# Patient Record
Sex: FEMALE | Race: WHITE | NOT HISPANIC OR LATINO | Employment: OTHER | URBAN - METROPOLITAN AREA
[De-identification: names, ages, dates, MRNs, and addresses within clinical notes are randomized per-mention and may not be internally consistent; named-entity substitution may affect disease eponyms.]

---

## 2023-03-03 LAB — URINE CULTURE: ABNORMAL

## 2023-04-15 DIAGNOSIS — E06.3 HYPOTHYROIDISM DUE TO HASHIMOTO'S THYROIDITIS: Primary | ICD-10-CM

## 2023-04-15 DIAGNOSIS — E03.8 HYPOTHYROIDISM DUE TO HASHIMOTO'S THYROIDITIS: Primary | ICD-10-CM

## 2023-04-17 RX ORDER — LEVOTHYROXINE SODIUM 150 UG/1
150 TABLET ORAL DAILY
Qty: 90 TABLET | Refills: 0 | Status: SHIPPED | OUTPATIENT
Start: 2023-04-17 | End: 2023-09-06 | Stop reason: SDUPTHER

## 2023-04-17 RX ORDER — LEVOTHYROXINE SODIUM 150 UG/1
1 TABLET ORAL DAILY
COMMUNITY
Start: 2017-07-25 | End: 2023-04-17 | Stop reason: SDUPTHER

## 2023-04-17 RX ORDER — LEVOTHYROXINE SODIUM 150 UG/1
TABLET ORAL
Qty: 90 TABLET | Refills: 0 | Status: SHIPPED | OUTPATIENT
Start: 2023-04-17 | End: 2023-09-06 | Stop reason: SDUPTHER

## 2023-06-01 PROBLEM — E11.42 POLYNEUROPATHY IN DIABETES (MULTI): Status: ACTIVE | Noted: 2023-06-01

## 2023-06-01 PROBLEM — L23.9 ALLERGIC CONTACT DERMATITIS: Status: ACTIVE | Noted: 2023-06-01

## 2023-06-01 PROBLEM — H43.811 PVD (POSTERIOR VITREOUS DETACHMENT), RIGHT EYE: Status: ACTIVE | Noted: 2023-06-01

## 2023-06-01 PROBLEM — G47.33 OBSTRUCTIVE SLEEP APNEA: Status: ACTIVE | Noted: 2023-06-01

## 2023-06-01 PROBLEM — H52.13 MYOPIA OF BOTH EYES: Status: ACTIVE | Noted: 2023-06-01

## 2023-06-01 PROBLEM — G47.10 HYPERSOMNIA: Status: ACTIVE | Noted: 2023-06-01

## 2023-06-01 PROBLEM — N81.11 CYSTOCELE, MIDLINE: Status: ACTIVE | Noted: 2023-06-01

## 2023-06-01 PROBLEM — M25.579 ANKLE PAIN: Status: ACTIVE | Noted: 2023-06-01

## 2023-06-01 PROBLEM — G25.81 RESTLESS LEGS SYNDROME: Status: ACTIVE | Noted: 2023-06-01

## 2023-06-01 PROBLEM — H26.9 CATARACT: Status: ACTIVE | Noted: 2023-06-01

## 2023-06-01 PROBLEM — R06.09 DYSPNEA ON EXERTION: Status: ACTIVE | Noted: 2023-06-01

## 2023-06-01 PROBLEM — F32.1 DEPRESSION, MAJOR, SINGLE EPISODE, MODERATE (MULTI): Status: ACTIVE | Noted: 2023-06-01

## 2023-06-01 PROBLEM — R06.02 SOB (SHORTNESS OF BREATH) ON EXERTION: Status: ACTIVE | Noted: 2023-06-01

## 2023-06-01 PROBLEM — M25.50 ARTHRALGIA: Status: ACTIVE | Noted: 2023-06-01

## 2023-06-01 PROBLEM — E78.5 HYPERLIPIDEMIA: Status: ACTIVE | Noted: 2023-06-01

## 2023-06-01 PROBLEM — Z96.1 PSEUDOPHAKIA OF BOTH EYES: Status: ACTIVE | Noted: 2023-06-01

## 2023-06-01 PROBLEM — Z95.1 S/P CABG (CORONARY ARTERY BYPASS GRAFT): Status: ACTIVE | Noted: 2023-06-01

## 2023-06-01 PROBLEM — E11.3293 TYPE 2 DIABETES MELLITUS WITH MILD NONPROLIFERATIVE DIABETIC RETINOPATHY WITHOUT MACULAR EDEMA, BILATERAL (MULTI): Status: ACTIVE | Noted: 2023-06-01

## 2023-06-01 PROBLEM — N32.81 OVERACTIVE BLADDER: Status: ACTIVE | Noted: 2023-06-01

## 2023-06-01 PROBLEM — N95.2 VAGINAL ATROPHY: Status: ACTIVE | Noted: 2023-06-01

## 2023-06-01 PROBLEM — G47.11 IDIOPATHIC HYPERSOMNIA: Status: ACTIVE | Noted: 2023-06-01

## 2023-06-01 PROBLEM — H43.812 PVD (POSTERIOR VITREOUS DETACHMENT), LEFT EYE: Status: ACTIVE | Noted: 2023-06-01

## 2023-06-01 PROBLEM — I10 HYPERTENSION: Status: ACTIVE | Noted: 2023-06-01

## 2023-06-01 PROBLEM — G47.419 NARCOLEPSY (HHS-HCC): Status: ACTIVE | Noted: 2023-06-01

## 2023-06-01 PROBLEM — L30.4 INTERTRIGO: Status: ACTIVE | Noted: 2023-06-01

## 2023-06-01 PROBLEM — F32.A ANXIETY AND DEPRESSION: Status: ACTIVE | Noted: 2023-06-01

## 2023-06-01 PROBLEM — E66.01 MORBID OBESITY (MULTI): Status: ACTIVE | Noted: 2023-06-01

## 2023-06-01 PROBLEM — I25.10 CORONARY ARTERY DISEASE: Status: ACTIVE | Noted: 2023-06-01

## 2023-06-01 PROBLEM — F41.9 ANXIETY AND DEPRESSION: Status: ACTIVE | Noted: 2023-06-01

## 2023-06-01 PROBLEM — E03.9 HYPOTHYROIDISM: Status: ACTIVE | Noted: 2023-06-01

## 2023-06-01 PROBLEM — R92.8 ABNORMAL MAMMOGRAM: Status: ACTIVE | Noted: 2023-06-01

## 2023-06-01 PROBLEM — N39.3 FEMALE GENUINE STRESS INCONTINENCE: Status: ACTIVE | Noted: 2023-06-01

## 2023-06-01 PROBLEM — R76.8 ANA POSITIVE: Status: ACTIVE | Noted: 2023-06-01

## 2023-06-01 PROBLEM — E11.9 DIABETES MELLITUS (MULTI): Status: ACTIVE | Noted: 2023-06-01

## 2023-06-17 LAB — URINE CULTURE: ABNORMAL

## 2023-08-29 DIAGNOSIS — F41.9 ANXIETY AND DEPRESSION: Primary | ICD-10-CM

## 2023-08-29 DIAGNOSIS — F32.A ANXIETY AND DEPRESSION: Primary | ICD-10-CM

## 2023-08-29 RX ORDER — SERTRALINE HYDROCHLORIDE 100 MG/1
100 TABLET, FILM COATED ORAL NIGHTLY
Qty: 10 TABLET | Refills: 0 | Status: SHIPPED | OUTPATIENT
Start: 2023-08-29 | End: 2023-09-06 | Stop reason: SDUPTHER

## 2023-08-29 RX ORDER — SERTRALINE HYDROCHLORIDE 100 MG/1
100 TABLET, FILM COATED ORAL NIGHTLY
COMMUNITY
End: 2023-08-29 | Stop reason: SDUPTHER

## 2023-08-30 LAB — URINE CULTURE: NO GROWTH

## 2023-09-06 ENCOUNTER — OFFICE VISIT (OUTPATIENT)
Dept: PRIMARY CARE | Facility: CLINIC | Age: 63
End: 2023-09-06
Payer: MEDICARE

## 2023-09-06 VITALS
DIASTOLIC BLOOD PRESSURE: 62 MMHG | BODY MASS INDEX: 41.27 KG/M2 | WEIGHT: 233 LBS | HEART RATE: 66 BPM | SYSTOLIC BLOOD PRESSURE: 128 MMHG | OXYGEN SATURATION: 96 % | RESPIRATION RATE: 16 BRPM | TEMPERATURE: 97 F

## 2023-09-06 DIAGNOSIS — I10 PRIMARY HYPERTENSION: Primary | ICD-10-CM

## 2023-09-06 DIAGNOSIS — F41.9 ANXIETY AND DEPRESSION: ICD-10-CM

## 2023-09-06 DIAGNOSIS — F32.A ANXIETY AND DEPRESSION: ICD-10-CM

## 2023-09-06 DIAGNOSIS — M25.561 ACUTE PAIN OF RIGHT KNEE: ICD-10-CM

## 2023-09-06 DIAGNOSIS — E78.2 MIXED HYPERLIPIDEMIA: ICD-10-CM

## 2023-09-06 DIAGNOSIS — E06.3 HYPOTHYROIDISM DUE TO HASHIMOTO'S THYROIDITIS: ICD-10-CM

## 2023-09-06 DIAGNOSIS — E03.8 HYPOTHYROIDISM DUE TO HASHIMOTO'S THYROIDITIS: ICD-10-CM

## 2023-09-06 DIAGNOSIS — E11.42 DIABETIC POLYNEUROPATHY ASSOCIATED WITH TYPE 2 DIABETES MELLITUS (MULTI): ICD-10-CM

## 2023-09-06 PROBLEM — R06.02 SOB (SHORTNESS OF BREATH) ON EXERTION: Status: RESOLVED | Noted: 2023-06-01 | Resolved: 2023-09-06

## 2023-09-06 PROCEDURE — 4010F ACE/ARB THERAPY RXD/TAKEN: CPT | Performed by: FAMILY MEDICINE

## 2023-09-06 PROCEDURE — 3074F SYST BP LT 130 MM HG: CPT | Performed by: FAMILY MEDICINE

## 2023-09-06 PROCEDURE — 3078F DIAST BP <80 MM HG: CPT | Performed by: FAMILY MEDICINE

## 2023-09-06 PROCEDURE — 99214 OFFICE O/P EST MOD 30 MIN: CPT | Performed by: FAMILY MEDICINE

## 2023-09-06 PROCEDURE — 3052F HG A1C>EQUAL 8.0%<EQUAL 9.0%: CPT | Performed by: FAMILY MEDICINE

## 2023-09-06 PROCEDURE — 1036F TOBACCO NON-USER: CPT | Performed by: FAMILY MEDICINE

## 2023-09-06 RX ORDER — ATORVASTATIN CALCIUM 80 MG/1
80 TABLET, FILM COATED ORAL NIGHTLY
Qty: 90 TABLET | Refills: 1 | Status: SHIPPED | OUTPATIENT
Start: 2023-09-06 | End: 2024-03-06 | Stop reason: SDUPTHER

## 2023-09-06 RX ORDER — GABAPENTIN 300 MG/1
300 CAPSULE ORAL DAILY
Qty: 30 CAPSULE | Refills: 11
Start: 2023-09-06 | End: 2023-11-02 | Stop reason: SDUPTHER

## 2023-09-06 RX ORDER — LEVOTHYROXINE SODIUM 150 UG/1
150 TABLET ORAL DAILY
Qty: 90 TABLET | Refills: 1 | Status: SHIPPED | OUTPATIENT
Start: 2023-09-06 | End: 2024-03-06 | Stop reason: SDUPTHER

## 2023-09-06 RX ORDER — METOPROLOL SUCCINATE 25 MG/1
25 TABLET, EXTENDED RELEASE ORAL 2 TIMES DAILY
COMMUNITY
End: 2023-11-02 | Stop reason: SDUPTHER

## 2023-09-06 RX ORDER — MIRABEGRON 25 MG/1
25 TABLET, FILM COATED, EXTENDED RELEASE ORAL DAILY
COMMUNITY
Start: 2022-02-01 | End: 2023-10-06

## 2023-09-06 RX ORDER — VALSARTAN 80 MG/1
80 TABLET ORAL DAILY
Qty: 90 TABLET | Refills: 1 | Status: SHIPPED | OUTPATIENT
Start: 2023-09-06 | End: 2024-03-06 | Stop reason: SDUPTHER

## 2023-09-06 RX ORDER — SERTRALINE HYDROCHLORIDE 100 MG/1
100 TABLET, FILM COATED ORAL NIGHTLY
Qty: 90 TABLET | Refills: 1 | Status: SHIPPED | OUTPATIENT
Start: 2023-09-06 | End: 2024-03-06 | Stop reason: SDUPTHER

## 2023-09-06 NOTE — PROGRESS NOTES
Subjective   Patient ID: Rajni Casiano is a 62 y.o. female who presents for Follow-up (6 mth med check).    Here for general check and med refill.    Taking medications daily for hypertension hyperlipidemia diabetes hypothyroidism, narcolepsy and sleep apnea  and anxiety.  Stable on all meds.     Seeing endocrinology regular.   Last A1C 8.6 and improving    Has been having issues with her mom and caring for her  Custody with her 2 great grandkids 1 yr and 2.5 yr old  Grandaughter on drugs  Going to court all the time  Has a lot of stressors in her life   Diet has not the best    Denies chest pain, shortness of breath, lightheaded, dizziness or headaches.     Had been off her sertraline for a few weeks and noticed a big difference.   Restarted and feeling much better    Admits to right knee pain and swelling for the past few weeks  No known injury           Review of Systems    Objective   /62   Pulse 66   Temp 36.1 °C (97 °F)   Resp 16   Wt 106 kg (233 lb)   SpO2 96%   BMI 41.27 kg/m²     Physical Exam    Assessment/Plan   Problem List Items Addressed This Visit       Anxiety and depression     Stable  Refilling medication at same dose         Relevant Medications    sertraline (Zoloft) 100 mg tablet    Hyperlipidemia     Stable  Reviewed previous labs and at goal.  Refilling meds at same dose.  Recheck in 6 months         Relevant Medications    atorvastatin (Lipitor) 80 mg tablet    Other Relevant Orders    Comprehensive Metabolic Panel    Lipid Panel    Hypertension - Primary     Stable  Refilling medication at same dose.  Reviewed recent labs.  Recheck in 3 months         Relevant Medications    valsartan (Diovan) 80 mg tablet    Other Relevant Orders    CBC and Auto Differential    Hypothyroidism     Stable  Checking TSH level and adjust meds accordingly             Relevant Medications    levothyroxine (Synthroid, Levoxyl) 150 mcg tablet    Other Relevant Orders    TSH with reflex to Free T4 if  abnormal    Polyneuropathy in diabetes (CMS/HCC)     Stable  Continue present meds         Relevant Medications    gabapentin (Neurontin) 300 mg capsule     Other Visit Diagnoses       Acute pain of right knee        suspect OA/tendonitis  Trial of Nsaids, ice, compression and rest.  Recommending PT eval, declined  Consider ortho if persists for injection

## 2023-10-04 DIAGNOSIS — N32.81 OAB (OVERACTIVE BLADDER): Primary | ICD-10-CM

## 2023-10-06 RX ORDER — MIRABEGRON 25 MG/1
TABLET, FILM COATED, EXTENDED RELEASE ORAL
Qty: 30 TABLET | Refills: 0 | Status: SHIPPED | OUTPATIENT
Start: 2023-10-06 | End: 2023-11-17

## 2023-10-18 ENCOUNTER — TELEPHONE (OUTPATIENT)
Dept: PRIMARY CARE | Facility: CLINIC | Age: 63
End: 2023-10-18
Payer: MEDICARE

## 2023-10-18 NOTE — TELEPHONE ENCOUNTER
Patient called and left , wants appointment for cough.  Called patient, states has had cough for a week and a half now.  Tested for COVID 3 days ago, negative.  Offered possible appointment for Friday but patient did not want to wait, going to Urgent Care.

## 2023-11-02 ENCOUNTER — LAB (OUTPATIENT)
Dept: LAB | Facility: LAB | Age: 63
End: 2023-11-02
Payer: MEDICARE

## 2023-11-02 ENCOUNTER — OFFICE VISIT (OUTPATIENT)
Dept: NEUROLOGY | Facility: HOSPITAL | Age: 63
End: 2023-11-02
Payer: MEDICARE

## 2023-11-02 VITALS
DIASTOLIC BLOOD PRESSURE: 64 MMHG | BODY MASS INDEX: 40.34 KG/M2 | HEART RATE: 66 BPM | WEIGHT: 227.7 LBS | SYSTOLIC BLOOD PRESSURE: 107 MMHG

## 2023-11-02 DIAGNOSIS — G47.33 OBSTRUCTIVE SLEEP APNEA: ICD-10-CM

## 2023-11-02 DIAGNOSIS — G25.81 RESTLESS LEGS SYNDROME: ICD-10-CM

## 2023-11-02 DIAGNOSIS — Z51.81 ENCOUNTER FOR THERAPEUTIC DRUG LEVEL MONITORING: ICD-10-CM

## 2023-11-02 DIAGNOSIS — G47.11 IDIOPATHIC HYPERSOMNIA: ICD-10-CM

## 2023-11-02 DIAGNOSIS — E11.42 DIABETIC POLYNEUROPATHY ASSOCIATED WITH TYPE 2 DIABETES MELLITUS (MULTI): ICD-10-CM

## 2023-11-02 DIAGNOSIS — G47.419 PRIMARY NARCOLEPSY WITHOUT CATAPLEXY (HHS-HCC): ICD-10-CM

## 2023-11-02 DIAGNOSIS — G47.419 PRIMARY NARCOLEPSY WITHOUT CATAPLEXY (HHS-HCC): Primary | ICD-10-CM

## 2023-11-02 PROBLEM — N39.46 MIXED STRESS AND URGE URINARY INCONTINENCE: Status: ACTIVE | Noted: 2023-11-02

## 2023-11-02 PROBLEM — I10 ESSENTIAL HYPERTENSION: Status: ACTIVE | Noted: 2020-05-05

## 2023-11-02 LAB
AMPHETAMINES UR QL SCN: ABNORMAL
BARBITURATES UR QL SCN: ABNORMAL
BENZODIAZ UR QL SCN: ABNORMAL
BZE UR QL SCN: ABNORMAL
CANNABINOIDS UR QL SCN: ABNORMAL
FENTANYL+NORFENTANYL UR QL SCN: ABNORMAL
OPIATES UR QL SCN: ABNORMAL
OXYCODONE+OXYMORPHONE UR QL SCN: ABNORMAL
PCP UR QL SCN: ABNORMAL

## 2023-11-02 PROCEDURE — 80324 DRUG SCREEN AMPHETAMINES 1/2: CPT

## 2023-11-02 PROCEDURE — 4010F ACE/ARB THERAPY RXD/TAKEN: CPT | Performed by: NURSE PRACTITIONER

## 2023-11-02 PROCEDURE — 3052F HG A1C>EQUAL 8.0%<EQUAL 9.0%: CPT | Performed by: NURSE PRACTITIONER

## 2023-11-02 PROCEDURE — 80307 DRUG TEST PRSMV CHEM ANLYZR: CPT

## 2023-11-02 PROCEDURE — 3074F SYST BP LT 130 MM HG: CPT | Performed by: NURSE PRACTITIONER

## 2023-11-02 PROCEDURE — 3078F DIAST BP <80 MM HG: CPT | Performed by: NURSE PRACTITIONER

## 2023-11-02 PROCEDURE — 1125F AMNT PAIN NOTED PAIN PRSNT: CPT | Performed by: NURSE PRACTITIONER

## 2023-11-02 PROCEDURE — 99215 OFFICE O/P EST HI 40 MIN: CPT | Performed by: NURSE PRACTITIONER

## 2023-11-02 PROCEDURE — 1036F TOBACCO NON-USER: CPT | Performed by: NURSE PRACTITIONER

## 2023-11-02 PROCEDURE — 1160F RVW MEDS BY RX/DR IN RCRD: CPT | Performed by: NURSE PRACTITIONER

## 2023-11-02 PROCEDURE — 1157F ADVNC CARE PLAN IN RCRD: CPT | Performed by: NURSE PRACTITIONER

## 2023-11-02 PROCEDURE — 1159F MED LIST DOCD IN RCRD: CPT | Performed by: NURSE PRACTITIONER

## 2023-11-02 RX ORDER — GABAPENTIN 300 MG/1
300 CAPSULE ORAL NIGHTLY
Qty: 90 CAPSULE | Refills: 1 | Status: SHIPPED | OUTPATIENT
Start: 2023-11-02 | End: 2024-06-03 | Stop reason: SDUPTHER

## 2023-11-02 RX ORDER — GLUCAGON INJECTION, SOLUTION 1 MG/.2ML
INJECTION, SOLUTION SUBCUTANEOUS
COMMUNITY
Start: 2023-06-27

## 2023-11-02 RX ORDER — METHYLPHENIDATE HYDROCHLORIDE 10 MG/1
10 TABLET ORAL 2 TIMES DAILY
COMMUNITY
End: 2023-11-02 | Stop reason: SDUPTHER

## 2023-11-02 RX ORDER — DULAGLUTIDE 4.5 MG/.5ML
4.5 INJECTION, SOLUTION SUBCUTANEOUS
COMMUNITY
Start: 2023-06-30 | End: 2024-03-06

## 2023-11-02 RX ORDER — METOPROLOL TARTRATE 25 MG/1
1 TABLET, FILM COATED ORAL 2 TIMES DAILY
COMMUNITY
Start: 2017-02-08 | End: 2023-11-16

## 2023-11-02 RX ORDER — METHYLPHENIDATE HYDROCHLORIDE 20 MG/1
20 TABLET ORAL 2 TIMES DAILY
Qty: 60 TABLET | Refills: 0 | Status: SHIPPED | OUTPATIENT
Start: 2023-11-02 | End: 2024-01-09 | Stop reason: SDUPTHER

## 2023-11-02 ASSESSMENT — SLEEP AND FATIGUE QUESTIONNAIRES
HOW LIKELY ARE YOU TO NOD OFF OR FALL ASLEEP WHILE LYING DOWN TO REST IN THE AFTERNOON WHEN CIRCUMSTANCES PERMIT: HIGH CHANCE OF DOZING
HOW LIKELY ARE YOU TO NOD OFF OR FALL ASLEEP WHILE WATCHING TV: HIGH CHANCE OF DOZING
HOW LIKELY ARE YOU TO NOD OFF OR FALL ASLEEP WHILE SITTING AND READING: HIGH CHANCE OF DOZING
HOW LIKELY ARE YOU TO NOD OFF OR FALL ASLEEP WHILE SITTING AND TALKING TO SOMEONE: SLIGHT CHANCE OF DOZING
HOW LIKELY ARE YOU TO NOD OFF OR FALL ASLEEP WHILE SITTING QUIETLY AFTER LUNCH WITHOUT ALCOHOL: MODERATE CHANCE OF DOZING
HOW LIKELY ARE YOU TO NOD OFF OR FALL ASLEEP WHEN YOU ARE A PASSENGER IN A CAR FOR AN HOUR WITHOUT A BREAK: HIGH CHANCE OF DOZING

## 2023-11-02 ASSESSMENT — ENCOUNTER SYMPTOMS
LOSS OF SENSATION IN FEET: 1
DEPRESSION: 0
OCCASIONAL FEELINGS OF UNSTEADINESS: 1

## 2023-11-02 ASSESSMENT — PAIN SCALES - GENERAL: PAINLEVEL: 5

## 2023-11-02 ASSESSMENT — VISUAL ACUITY: VA_NORMAL: 1

## 2023-11-02 NOTE — ASSESSMENT & PLAN NOTE
Methylphenidate increased to 20 mg BID.   Pt does have history of CAD s/p bypass per pt. States previously cleared by her cardiologist to be on stimulants. HR and BP are good. Discussed risk associated with increasing stimulant dosing, pt states understanding.   UDS ordered - pt currently out of her methylphenidate.  CSA signed today.   I have personally reviewed the OARRS report for this patient. The report is scanned into the electronic medical record. I have considered the risks of abuse, dependence, addiction and diversion. I believe that it is clinically appropriate for this patient to be prescribed this medication based on documented diagnosis of hypersomnia and narcolepsy. An updated contract between the patient and myself was signed, scanned into the electronic medical record, and the patient agrees to the terms. Any deviation from the agreement will result in termination from my practice.

## 2023-11-02 NOTE — PATIENT INSTRUCTIONS
Sleep Hygiene practices include:  - Go to bed and wake up at around the same times each day, even on weekends. Avoid naps.   - If you cannot fall asleep within 15-30 min after going to bed, get up and read or do some other relaxing activity until you feel tired.  - Reduce stress. Do something relaxing in the evening before bedtime.  - Avoid caffeine (especially after noon), alcohol, tobacco, and medicines that keep you awake.  - Alcohol and recreational drugs actually make insomnia more likely.  - Try drinking less water in the evening to avoid waking up to go to the bathroom during the night.   - Get regular exercise for 30-60 min at least 3 times per week, but do it at least 4-6 hours before bedtime.  - Reserve the bedroom for sleep and sexual activity, not for watching tv or other activities.  - Negative envisioning (instead of trying to fall asleep, try to stay awake!)  - Avoid disturbing content in books or media immediately prior to sleep. Plan 30 min of pleasant or relaxing activities prior to going to bed.  - The computer can act as a stimulant. Discontinue working or playing on the computer one hour prior to desired sleep onset.  - Sleepio or Shut-I are online programs for cognitive behavioral therapy which may help.  - Practice relaxation techniques.     Your Positive Airway Pressure (CPAP/BiPAP) settings are adequately controlling your Apnea, please continue to use nightly.   An updated supply order has been sent to the [facility] [phone].     As a general guideline, please replace your:   -PAP cushions every 2-4 weeks  -mask every 3-6 months  -hose every 3-6 months  -Filter (disposable) every 2-4 weeks  -machine may need replacement in 5-10 years (once they stop working).

## 2023-11-02 NOTE — PROGRESS NOTES
"SUBJECTIVE    Rajni Casiano is a 63 y.o. right-handed female who presents with   Chief Complaint   Patient presents with    hypersomnia    Narcolepsy    Restless Legs    Sleep Apnea     FU visit.      .    Presents for follow up visit  Visit type: in-clinic visit    HISTORY OF PRESENT ILLNESS    RECAP:  Former patient of Dr. Brink.   \"12/6/2022:  - GODWIN :She reports she is using her CPAP every night and is able to wear it all night. CPAP pressure 10, average ASI 2.7. She has a separate machine she wears at her mom's house. She does notice a difference when she forgets to wear it. She is still experiencing a lot of night time waking to use the bathroom.   - At the last visit, she was increased to Adderall 20 mg in the morning and after lunch. She has not noticed a difference in her daytime sleepiness since the increased dose. She has not experienced any side effects including weight changes, heart palpitations. Falling asleep 2-3 x each morning. No drop attacks, hallucinations, cataplexy. - [place] sleepiness scale 14. \"I get up in the morning around 9:00a.m. to get ready.\" Takes her shower, walks to dog and then goes back to sleep if she sleeps in her chair.   Usually goes to bed at 1:00 a.m., but has to get up several times a night to go to the bathroom. Has incontinence and bladder sling. Helped. but not completely. That is causing fragmented sleep.   - Restless legs well controlled on gabapentin 300 mg at night.   - Completed controlled substance agreement today   - No other changes to health or visits to the hospital      Finds short naps refreshing.      IMP:   1. Narcolepsy vs. Idiopathic hypersomnia. No improvement since increased Adderall to 20mg twice daily.   2. Restless leg syndrome. Good control on gabapentin.   3. GODWIN, good control with CPAP      1 Keep adderall as 20mg BID.  I personally reviewed the OARRS report for this patient. The report is scanned into the electronic medical record. An " updated stimulant contract between the patient and myself was signed, scanned into the electronic medical record and the patient agrees to the terms. Any deviation from the agreement will result in termination from my practice.  2. Refilled gabapentin.   3. Trial of Wakix 17.8 mg. Start with 8.9 mg x one week.  4. Urine drug screen.      1/10/23 - Urine drug screen reviewed, no variances. Really tired in mornings. Can wake up, eat breakfast and go back to sleep for 30 min to a few hours. After that doing well until she goes to sleep around 1 am. Wakes up around 9 am.      Gabapentin is controlling her RLS well. No continued difficulty falling asleep due to RLS.     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      Most recent PSG and MSLT done at  in [place] in 2017 - indicative of idiopathic hypersomnia at that time. PLMs at 11.5. No AHI given but CPAP titrated up from pressure 7 to pressure 10 to control her AHI.      Previously on vyvanse and ropinerole.      ESS 11     Pt did try the wakix for a couple days. States she did not feel well and was having some back pain. She stopped taking it, is now feeling better and would like to try it again.     11/02/23 -     Reports was doing well on ritalin, still sleepy throughout day but able to stay awake if doing something. Currently out of her medication including gabapentin as well. Feels the ritalin is similar in efficacy to the adderall for her.   Just got custody of two grandkids. One is 2.5 years old so needing to be awake to raise them.     CPAP download unavailable at this time.     ESS (see below) 17/24 today    REVIEW OF SYSTEMS:  10 point review of systems performed and is negative except as noted in the HPI.     Past Medical History:   Diagnosis Date    Narcolepsy without cataplexy     Narcolepsy    Obstructive sleep apnea (adult) (pediatric)     Obstructive sleep apnea    Personal history of other diseases of the circulatory system      History of hypertension    Personal history of other endocrine, nutritional and metabolic disease     History of hypercholesterolemia    Personal history of other endocrine, nutritional and metabolic disease     History of hypothyroidism    Personal history of other endocrine, nutritional and metabolic disease     History of diabetes mellitus       No relevant family history has been documented for this patient.    Past Surgical History:   Procedure Laterality Date    CATARACT EXTRACTION  07/03/2013    Cataract Surgery    CORONARY ARTERY BYPASS GRAFT  01/12/2017    CABG    HYSTERECTOMY  07/03/2013    Hysterectomy    NECK SURGERY  07/03/2013    Neck Surgery       Social History     Substance and Sexual Activity   Drug Use Not Currently     Tobacco Use: Low Risk  (11/2/2023)    Patient History     Smoking Tobacco Use: Never     Smokeless Tobacco Use: Never     Passive Exposure: Never     Alcohol Use: Not on file       Current Outpatient Medications on File Prior to Visit   Medication Sig Dispense Refill    atorvastatin (Lipitor) 80 mg tablet Take 1 tablet (80 mg) by mouth once daily at bedtime. 90 tablet 1    Gvoke PFS 2-Pack Syringe 1 mg/0.2 mL syringe INJECT 1 MG SUBCUTANEOUSLY AS NEEDED FOR HYPOGLYCEMIA. IF BLOOD GLUCOSE NOT RESPONDING IN 15 MINUTES  REPEAT WITH ANOTHER DOSE      insulin regular (HumuLIN R U-500) 500 unit/mL CONCENTRATED injection .25 mL brkfst, .25 mL lunch, .10 mL dinner Subcutaneous with meals for 90 days      levothyroxine (Synthroid, Levoxyl) 150 mcg tablet Take 1 tablet (150 mcg) by mouth once daily. 90 tablet 1    metoprolol tartrate (Lopressor) 25 mg tablet Take 1 tablet (25 mg) by mouth 2 times a day.      Myrbetriq 25 mg tablet extended release 24 hr 24 hr tablet TAKE ONE TABLET BY MOUTH ONCE DAILY. DO NOT CRUSH OR CUT. 30 tablet 0    sertraline (Zoloft) 100 mg tablet Take 1 tablet (100 mg) by mouth once daily at bedtime. 90 tablet 1    Trulicity 4.5 mg/0.5 mL pen injector Inject 4.5  mg under the skin 1 (one) time per week.      valsartan (Diovan) 80 mg tablet Take 1 tablet (80 mg) by mouth once daily. 90 tablet 1    [DISCONTINUED] dulaglutide (Trulicity) 0.75 mg/0.5 mL pen injector Inject 0.75 mg under the skin 1 (one) time per week.      [DISCONTINUED] gabapentin (Neurontin) 300 mg capsule Take 1 capsule (300 mg) by mouth once daily. 30 capsule 11    [DISCONTINUED] methylphenidate (Ritalin) 10 mg tablet Take 1 tablet (10 mg) by mouth 2 times a day.      [DISCONTINUED] metoprolol succinate XL (Toprol-XL) 25 mg 24 hr tablet Take 1 tablet (25 mg) by mouth 2 times a day.      [DISCONTINUED] amphetamine-dextroamphetamine (Adderall) 10 mg tablet Take 1 tablet (10 mg) by mouth once daily.       No current facility-administered medications on file prior to visit.         OBJECTIVE    /64   Pulse 66   Wt 103 kg (227 lb 11.2 oz)   BMI 40.34 kg/m²     Physical Exam  Eyes:      General: Lids are normal.      Extraocular Movements: Extraocular movements intact.      Pupils: Pupils are equal, round, and reactive to light.   Neurological:      Motor: Motor strength is normal.     Coordination: Coordination is intact.   Psychiatric:         Speech: Speech normal.       Neurological Exam  Mental Status  Awake, alert and oriented to person, place and time. Oriented to person, place, time and situation. Recent and remote memory are intact. Speech is normal. Language is fluent with no aphasia. Attention and concentration are normal. Fund of knowledge is appropriate for level of education.    Cranial Nerves  CN II: Visual acuity is normal. Visual fields full to confrontation.  CN III, IV, VI: Extraocular movements intact bilaterally. Normal lids and orbits bilaterally. Pupils equal round and reactive to light bilaterally.  CN V: Facial sensation is normal.  CN VII: Full and symmetric facial movement.  CN VIII: Hearing is normal.  CN IX, X: Palate elevates symmetrically. Normal gag reflex.  CN XI: Shoulder  shrug strength is normal.  CN XII: Tongue midline without atrophy or fasciculations.    Motor  Normal muscle bulk throughout. No fasciculations present. Normal muscle tone. No abnormal involuntary movements. Strength is 5/5 throughout all four extremities.    Sensory  Sensation is intact to light touch, pinprick, vibration and proprioception in all four extremities.    Coordination    Finger-to-nose, rapid alternating movements and heel-to-shin normal bilaterally without dysmetria.    Gait  Casual gait is normal including stance, stride, and arm swing.      EPWORTH SLEEPINESS SCALE   Sitting and reading 3   Watching television 3   Sitting inactive in a public place 2   As passenger in car for an hour or more without a break 3   Lying down to rest in the afternoon when circumstances permit 3   Sitting and talking to someone 1   Sitting quietly after a lunch without alcohol 2   In a car while stopped for a few minutes in traffic 0   TOTAL 17/24         CT HEAD WO IV CONTRAST 01/18/2020    Narrative  MRN: [id number]  Patient Name: CAROLIN VILLAGOMEZ    STUDY:  CT HEAD WO CONTRAST; 1/18/2020 2:58 am    INDICATION:  Fall with head injury.    COMPARISON:  None.    ACCESSION NUMBER(S):  [id number]    ORDERING CLINICIAN:  ZANE YUSUF    TECHNIQUE:  Axial noncontrast CT images of the head.    FINDINGS:  BRAIN PARENCHYMA: Gray-white matter interfaces are preserved. No mass  effect or midline shift.    HEMORRHAGE: No acute intracranial hemorrhage.  VENTRICLES and EXTRA-AXIAL SPACES: Normal size.  EXTRACRANIAL SOFT TISSUES: Within normal limits.  PARANASAL SINUSES/MASTOIDS: The visualized paranasal sinuses and  mastoid air cells are aerated.  CALVARIUM: No depressed skull fracture. No destructive osseous lesion.    OTHER FINDINGS: None.    Impression  No acute intracranial abnormality.             ASSESSMENT & PLAN  Problem List Items Addressed This Visit          Neuro    Restless legs syndrome    Overview     Well controlled on  gabapentin 300 mg at night         Current Assessment & Plan     Currently out of medication, rx sent.          Relevant Medications    gabapentin (Neurontin) 300 mg capsule    Other Relevant Orders    Follow Up In Neurology    Polyneuropathy in diabetes (CMS/HCC)    Relevant Medications    gabapentin (Neurontin) 300 mg capsule    Other Relevant Orders    Follow Up In Neurology       Sleep    Idiopathic hypersomnia    Overview     Has prior narcolepsy diagnosis but no supporting MSLT  Sleep attacks controlled on stimulant but still sleepy overall.   No cataplexy.   Failed wakix (side effects) and vyvanse (no help per pt)  S/p adderall 30 mg am and 10 mg early afternoon (changed to ritalin due to shortage)  Currently on methylphenidate 10 mg BID. Still very sleepy.            Current Assessment & Plan     Methylphenidate increased to 20 mg BID.   Pt does have history of CAD s/p bypass per pt. States previously cleared by her cardiologist to be on stimulants. HR and BP are good. Discussed risk associated with increasing stimulant dosing, pt states understanding.   UDS ordered - pt currently out of her methylphenidate.  CSA signed today.   I have personally reviewed the OARRS report for this patient. The report is scanned into the electronic medical record. I have considered the risks of abuse, dependence, addiction and diversion. I believe that it is clinically appropriate for this patient to be prescribed this medication based on documented diagnosis of hypersomnia and narcolepsy. An updated contract between the patient and myself was signed, scanned into the electronic medical record, and the patient agrees to the terms. Any deviation from the agreement will result in termination from my practice.           Relevant Medications    methylphenidate (Ritalin) 20 mg tablet    Other Relevant Orders    Drug Screen, Urine With Reflex to Confirmation (Completed)    Follow Up In Neurology    Narcolepsy - Primary    Relevant  Medications    methylphenidate (Ritalin) 20 mg tablet    Other Relevant Orders    Drug Screen, Urine With Reflex to Confirmation (Completed)    Follow Up In Neurology    Obstructive sleep apnea    Relevant Orders    Positive Airway Pressure (PAP) Therapy    Follow Up In Neurology     Other Visit Diagnoses       Encounter for therapeutic drug level monitoring        Relevant Orders    Drug Screen, Urine With Reflex to Confirmation (Completed)    Follow Up In Neurology              FU in 6 months   I personally spent 48 minutes today, exclusive of procedures, providing care for this patient, including preparation, face to face time, documentation and other services such as review of medical records, diagnostic result, patient education, counseling, coordination of care as specified in the encounter.

## 2023-11-06 LAB
AMPHET UR-MCNC: 3941 NG/ML
MDA UR-MCNC: <200 NG/ML
MDEA UR-MCNC: <200 NG/ML
MDMA UR-MCNC: <200 NG/ML
METHAMPHET UR-MCNC: <200 NG/ML
PHENTERMINE UR CFM-MCNC: <200 NG/ML

## 2023-11-16 DIAGNOSIS — Z95.1 S/P CABG (CORONARY ARTERY BYPASS GRAFT): ICD-10-CM

## 2023-11-16 DIAGNOSIS — N32.81 OAB (OVERACTIVE BLADDER): ICD-10-CM

## 2023-11-16 DIAGNOSIS — I25.10 CORONARY ARTERY DISEASE INVOLVING NATIVE CORONARY ARTERY OF NATIVE HEART, UNSPECIFIED WHETHER ANGINA PRESENT: Primary | ICD-10-CM

## 2023-11-16 RX ORDER — METOPROLOL TARTRATE 25 MG/1
25 TABLET, FILM COATED ORAL 2 TIMES DAILY
Qty: 180 TABLET | Refills: 3 | Status: SHIPPED | OUTPATIENT
Start: 2023-11-16 | End: 2024-11-10

## 2023-11-17 RX ORDER — MIRABEGRON 25 MG/1
25 TABLET, FILM COATED, EXTENDED RELEASE ORAL DAILY
Qty: 30 TABLET | Refills: 0 | Status: SHIPPED | OUTPATIENT
Start: 2023-11-17 | End: 2024-03-06

## 2023-12-09 NOTE — PROGRESS NOTES
HISTORY OF PRESENT ILLNESS    Rajni marie 63 y.o. here today for cystoscopy + intradetrusor Botox injections     Urinary symptoms:  - S/P 6/28: cystoscopy + intradetrusor Botox injections 100 units      She did not feel like the last injection helped her symptyoms.  Discussed pros and cons of increasing dose and opted to do 130 units today    Procedures  The patient gave a urine sample which was checked for infection and found to be negative.  Pt was numbed for 20 min with lidojet inserted in to the bladder and given 100 mg po pyridium.    The patient was consented for cytoscopic intradetrusor Botox injection.     130   Units was reconstituted in  7   mL of NS  Using a flexible scope injection was performed at 4 sites using the Laborie needle at a 3 mm depth starting in the midline just above the intraureteric ridge and then to the left and right of this site and then just above until the entire drug volume was injected.    No bleeding was noted    The procedure was completed, the patient allowed to empty her bladder and get dressed.      ASSESSMENT &PLAN     Assessment:   63 y.o. old female being assessed for OAB    Diagnoses:   #1 OAB    Plan:   OAB  - cystoscopy + intradetrusor Botox injections XX U performed in office today without complications   - Post-procedure precautions were given to the patient   - Rx'd macrobid 100 mg po BID x 3 days script sent.    She will follow-up with Edita Garcia in 1 month.    Scribe Attestation  By signing my name below, I, Kandy Faustin , Scribe   attest that this documentation has been prepared under the direction and in the presence of Silvia Rosa MD.     Agree with above  I Dr. Rosa, personally performed the services described in the documentation which was scribed virtually and confirm it is both complete and accurate.  Silvia Rosa MD

## 2023-12-11 ENCOUNTER — PROCEDURE VISIT (OUTPATIENT)
Dept: OBSTETRICS AND GYNECOLOGY | Facility: CLINIC | Age: 63
End: 2023-12-11
Payer: MEDICARE

## 2023-12-11 DIAGNOSIS — N32.81 OVERACTIVE BLADDER: ICD-10-CM

## 2023-12-11 DIAGNOSIS — N39.0 ACUTE LOWER UTI: Primary | ICD-10-CM

## 2023-12-11 LAB
POC APPEARANCE, URINE: ABNORMAL
POC BILIRUBIN, URINE: NEGATIVE
POC BLOOD, URINE: ABNORMAL
POC COLOR, URINE: YELLOW
POC GLUCOSE, URINE: ABNORMAL MG/DL
POC KETONES, URINE: NEGATIVE MG/DL
POC LEUKOCYTES, URINE: ABNORMAL
POC NITRITE,URINE: NEGATIVE
POC PH, URINE: 6.5 PH
POC PROTEIN, URINE: NEGATIVE MG/DL
POC SPECIFIC GRAVITY, URINE: 1.02
POC UROBILINOGEN, URINE: 4 EU/DL

## 2023-12-11 PROCEDURE — 99213 OFFICE O/P EST LOW 20 MIN: CPT | Mod: PO | Performed by: OBSTETRICS & GYNECOLOGY

## 2023-12-11 PROCEDURE — 2500000005 HC RX 250 GENERAL PHARMACY W/O HCPCS: Mod: PO | Performed by: OBSTETRICS & GYNECOLOGY

## 2023-12-11 PROCEDURE — 81003 URINALYSIS AUTO W/O SCOPE: CPT | Performed by: OBSTETRICS & GYNECOLOGY

## 2023-12-11 PROCEDURE — 99213 OFFICE O/P EST LOW 20 MIN: CPT | Performed by: OBSTETRICS & GYNECOLOGY

## 2023-12-11 PROCEDURE — 2500000004 HC RX 250 GENERAL PHARMACY W/ HCPCS (ALT 636 FOR OP/ED): Mod: JZ,PO | Performed by: OBSTETRICS & GYNECOLOGY

## 2023-12-11 PROCEDURE — A4216 STERILE WATER/SALINE, 10 ML: HCPCS | Mod: PO | Performed by: OBSTETRICS & GYNECOLOGY

## 2023-12-11 PROCEDURE — 52287 CYSTOSCOPY CHEMODENERVATION: CPT | Mod: PO | Performed by: OBSTETRICS & GYNECOLOGY

## 2023-12-11 PROCEDURE — 2500000001 HC RX 250 WO HCPCS SELF ADMINISTERED DRUGS (ALT 637 FOR MEDICARE OP): Mod: PO | Performed by: OBSTETRICS & GYNECOLOGY

## 2023-12-11 PROCEDURE — 52287 CYSTOSCOPY CHEMODENERVATION: CPT | Performed by: OBSTETRICS & GYNECOLOGY

## 2023-12-11 RX ORDER — LIDOCAINE HYDROCHLORIDE 20 MG/ML
1 JELLY TOPICAL ONCE
Status: COMPLETED | OUTPATIENT
Start: 2023-12-11 | End: 2023-12-11

## 2023-12-11 RX ORDER — GENTAMICIN 40 MG/ML
80 INJECTION, SOLUTION INTRAMUSCULAR; INTRAVENOUS ONCE
Status: COMPLETED | OUTPATIENT
Start: 2023-12-11 | End: 2023-12-11

## 2023-12-11 RX ORDER — LIDOCAINE HYDROCHLORIDE 10 MG/ML
5 INJECTION INFILTRATION; PERINEURAL ONCE
Status: COMPLETED | OUTPATIENT
Start: 2023-12-11 | End: 2023-12-11

## 2023-12-11 RX ORDER — PHENAZOPYRIDINE HYDROCHLORIDE 200 MG/1
200 TABLET, FILM COATED ORAL ONCE
Status: COMPLETED | OUTPATIENT
Start: 2023-12-11 | End: 2023-12-11

## 2023-12-11 RX ORDER — NITROFURANTOIN 25; 75 MG/1; MG/1
100 CAPSULE ORAL ONCE
Status: COMPLETED | OUTPATIENT
Start: 2023-12-11 | End: 2023-12-11

## 2023-12-11 RX ORDER — NITROFURANTOIN 25; 75 MG/1; MG/1
100 CAPSULE ORAL 2 TIMES DAILY
Qty: 5 CAPSULE | Refills: 0 | Status: SHIPPED | OUTPATIENT
Start: 2023-12-11 | End: 2023-12-14

## 2023-12-11 RX ORDER — SODIUM CHLORIDE 9 MG/ML
10 INJECTION, SOLUTION INTRAMUSCULAR; INTRAVENOUS; SUBCUTANEOUS ONCE
Status: COMPLETED | OUTPATIENT
Start: 2023-12-11 | End: 2023-12-11

## 2023-12-11 RX ADMIN — PHENAZOPYRIDINE 200 MG: 200 TABLET ORAL at 16:30

## 2023-12-11 RX ADMIN — LIDOCAINE HYDROCHLORIDE 1 APPLICATION: 20 JELLY TOPICAL at 16:30

## 2023-12-11 RX ADMIN — SODIUM CHLORIDE 10 ML: 9 INJECTION INTRAMUSCULAR; INTRAVENOUS; SUBCUTANEOUS at 16:30

## 2023-12-11 RX ADMIN — LIDOCAINE HYDROCHLORIDE 50 MG: 10 INJECTION, SOLUTION INFILTRATION; PERINEURAL at 16:30

## 2023-12-11 RX ADMIN — ONABOTULINUMTOXINA 130 UNITS: 100 INJECTION, POWDER, LYOPHILIZED, FOR SOLUTION INTRADERMAL; INTRAMUSCULAR at 17:30

## 2023-12-11 RX ADMIN — NITROFURANTOIN MONOHYDRATE/MACROCRYSTALLINE 100 MG: 25; 75 CAPSULE ORAL at 16:30

## 2023-12-11 RX ADMIN — GENTAMICIN SULFATE 80 MG: 40 INJECTION, SOLUTION INTRAMUSCULAR; INTRAVENOUS at 18:27

## 2023-12-14 ENCOUNTER — APPOINTMENT (OUTPATIENT)
Dept: OBSTETRICS AND GYNECOLOGY | Facility: CLINIC | Age: 63
End: 2023-12-14
Payer: MEDICARE

## 2024-01-09 ENCOUNTER — TELEPHONE (OUTPATIENT)
Dept: NEUROLOGY | Facility: HOSPITAL | Age: 64
End: 2024-01-09
Payer: MEDICARE

## 2024-01-09 DIAGNOSIS — G47.11 IDIOPATHIC HYPERSOMNIA: ICD-10-CM

## 2024-01-09 DIAGNOSIS — G47.419 PRIMARY NARCOLEPSY WITHOUT CATAPLEXY (HHS-HCC): ICD-10-CM

## 2024-01-09 RX ORDER — METHYLPHENIDATE HYDROCHLORIDE 20 MG/1
20 TABLET ORAL 2 TIMES DAILY
Qty: 60 TABLET | Refills: 0 | Status: SHIPPED | OUTPATIENT
Start: 2024-01-09 | End: 2024-02-28 | Stop reason: SDUPTHER

## 2024-01-10 ENCOUNTER — TELEPHONE (OUTPATIENT)
Dept: OBSTETRICS AND GYNECOLOGY | Facility: CLINIC | Age: 64
End: 2024-01-10
Payer: MEDICARE

## 2024-01-10 NOTE — TELEPHONE ENCOUNTER
Left phone message for Rajni Casiano to notify her of PA authorization and asking if she needs a new rx. Also want to clarify what OAB med she's on.

## 2024-02-28 DIAGNOSIS — G47.419 PRIMARY NARCOLEPSY WITHOUT CATAPLEXY (HHS-HCC): ICD-10-CM

## 2024-02-28 DIAGNOSIS — G47.11 IDIOPATHIC HYPERSOMNIA: ICD-10-CM

## 2024-02-29 RX ORDER — METHYLPHENIDATE HYDROCHLORIDE 20 MG/1
20 TABLET ORAL 2 TIMES DAILY
Qty: 60 TABLET | Refills: 0 | Status: SHIPPED | OUTPATIENT
Start: 2024-02-29 | End: 2024-03-22 | Stop reason: SDUPTHER

## 2024-03-06 ENCOUNTER — OFFICE VISIT (OUTPATIENT)
Dept: PRIMARY CARE | Facility: CLINIC | Age: 64
End: 2024-03-06
Payer: MEDICARE

## 2024-03-06 VITALS
TEMPERATURE: 97.8 F | HEIGHT: 63 IN | WEIGHT: 224 LBS | BODY MASS INDEX: 39.69 KG/M2 | OXYGEN SATURATION: 96 % | DIASTOLIC BLOOD PRESSURE: 70 MMHG | SYSTOLIC BLOOD PRESSURE: 120 MMHG | RESPIRATION RATE: 16 BRPM | HEART RATE: 61 BPM

## 2024-03-06 DIAGNOSIS — Z00.00 MEDICARE ANNUAL WELLNESS VISIT, SUBSEQUENT: Primary | ICD-10-CM

## 2024-03-06 DIAGNOSIS — N39.46 MIXED STRESS AND URGE URINARY INCONTINENCE: ICD-10-CM

## 2024-03-06 DIAGNOSIS — M25.561 ACUTE PAIN OF RIGHT KNEE: ICD-10-CM

## 2024-03-06 DIAGNOSIS — I10 PRIMARY HYPERTENSION: ICD-10-CM

## 2024-03-06 DIAGNOSIS — Z12.31 ENCOUNTER FOR SCREENING MAMMOGRAM FOR MALIGNANT NEOPLASM OF BREAST: ICD-10-CM

## 2024-03-06 DIAGNOSIS — E10.42 DIABETIC POLYNEUROPATHY ASSOCIATED WITH TYPE 1 DIABETES MELLITUS (MULTI): ICD-10-CM

## 2024-03-06 DIAGNOSIS — Z11.59 SCREENING FOR VIRAL DISEASE: ICD-10-CM

## 2024-03-06 DIAGNOSIS — E66.01 MORBID OBESITY (MULTI): ICD-10-CM

## 2024-03-06 DIAGNOSIS — F41.9 ANXIETY AND DEPRESSION: ICD-10-CM

## 2024-03-06 DIAGNOSIS — G47.429 NARCOLEPSY DUE TO UNDERLYING CONDITION WITHOUT CATAPLEXY (HHS-HCC): ICD-10-CM

## 2024-03-06 DIAGNOSIS — E11.3293 TYPE 2 DIABETES MELLITUS WITH BOTH EYES AFFECTED BY MILD NONPROLIFERATIVE RETINOPATHY WITHOUT MACULAR EDEMA, WITHOUT LONG-TERM CURRENT USE OF INSULIN (MULTI): ICD-10-CM

## 2024-03-06 DIAGNOSIS — Z12.11 COLON CANCER SCREENING: ICD-10-CM

## 2024-03-06 DIAGNOSIS — E10.9 TYPE 1 DIABETES MELLITUS WITHOUT COMPLICATION (MULTI): ICD-10-CM

## 2024-03-06 DIAGNOSIS — G47.33 OBSTRUCTIVE SLEEP APNEA: ICD-10-CM

## 2024-03-06 DIAGNOSIS — E78.2 MIXED HYPERLIPIDEMIA: ICD-10-CM

## 2024-03-06 DIAGNOSIS — E06.3 HYPOTHYROIDISM DUE TO HASHIMOTO'S THYROIDITIS: ICD-10-CM

## 2024-03-06 DIAGNOSIS — F32.1 DEPRESSION, MAJOR, SINGLE EPISODE, MODERATE (MULTI): ICD-10-CM

## 2024-03-06 DIAGNOSIS — Z78.0 MENOPAUSE: ICD-10-CM

## 2024-03-06 DIAGNOSIS — E03.8 HYPOTHYROIDISM DUE TO HASHIMOTO'S THYROIDITIS: ICD-10-CM

## 2024-03-06 DIAGNOSIS — I10 ESSENTIAL HYPERTENSION: ICD-10-CM

## 2024-03-06 DIAGNOSIS — F32.A ANXIETY AND DEPRESSION: ICD-10-CM

## 2024-03-06 PROBLEM — N95.2 VAGINAL ATROPHY: Status: RESOLVED | Noted: 2023-06-01 | Resolved: 2024-03-06

## 2024-03-06 PROBLEM — N32.81 OVERACTIVE BLADDER: Status: RESOLVED | Noted: 2023-06-01 | Resolved: 2024-03-06

## 2024-03-06 PROBLEM — M25.579 ANKLE PAIN: Status: RESOLVED | Noted: 2023-06-01 | Resolved: 2024-03-06

## 2024-03-06 PROBLEM — H43.811 PVD (POSTERIOR VITREOUS DETACHMENT), RIGHT EYE: Status: RESOLVED | Noted: 2023-06-01 | Resolved: 2024-03-06

## 2024-03-06 PROBLEM — H43.812 PVD (POSTERIOR VITREOUS DETACHMENT), LEFT EYE: Status: RESOLVED | Noted: 2023-06-01 | Resolved: 2024-03-06

## 2024-03-06 PROBLEM — L23.9 ALLERGIC CONTACT DERMATITIS: Status: RESOLVED | Noted: 2023-06-01 | Resolved: 2024-03-06

## 2024-03-06 PROBLEM — M25.50 ARTHRALGIA: Status: RESOLVED | Noted: 2023-06-01 | Resolved: 2024-03-06

## 2024-03-06 PROBLEM — H52.13 MYOPIA OF BOTH EYES: Status: RESOLVED | Noted: 2023-06-01 | Resolved: 2024-03-06

## 2024-03-06 PROBLEM — Z96.1 PSEUDOPHAKIA OF BOTH EYES: Status: RESOLVED | Noted: 2023-06-01 | Resolved: 2024-03-06

## 2024-03-06 PROBLEM — L30.4 INTERTRIGO: Status: RESOLVED | Noted: 2023-06-01 | Resolved: 2024-03-06

## 2024-03-06 PROBLEM — N39.3 FEMALE GENUINE STRESS INCONTINENCE: Status: RESOLVED | Noted: 2023-06-01 | Resolved: 2024-03-06

## 2024-03-06 PROCEDURE — 1157F ADVNC CARE PLAN IN RCRD: CPT | Performed by: FAMILY MEDICINE

## 2024-03-06 PROCEDURE — 1159F MED LIST DOCD IN RCRD: CPT | Performed by: FAMILY MEDICINE

## 2024-03-06 PROCEDURE — 4010F ACE/ARB THERAPY RXD/TAKEN: CPT | Performed by: FAMILY MEDICINE

## 2024-03-06 PROCEDURE — 1125F AMNT PAIN NOTED PAIN PRSNT: CPT | Performed by: FAMILY MEDICINE

## 2024-03-06 PROCEDURE — 1036F TOBACCO NON-USER: CPT | Performed by: FAMILY MEDICINE

## 2024-03-06 PROCEDURE — 3078F DIAST BP <80 MM HG: CPT | Performed by: FAMILY MEDICINE

## 2024-03-06 PROCEDURE — 1158F ADVNC CARE PLAN TLK DOCD: CPT | Performed by: FAMILY MEDICINE

## 2024-03-06 PROCEDURE — 1170F FXNL STATUS ASSESSED: CPT | Performed by: FAMILY MEDICINE

## 2024-03-06 PROCEDURE — 3074F SYST BP LT 130 MM HG: CPT | Performed by: FAMILY MEDICINE

## 2024-03-06 PROCEDURE — 1160F RVW MEDS BY RX/DR IN RCRD: CPT | Performed by: FAMILY MEDICINE

## 2024-03-06 PROCEDURE — 1123F ACP DISCUSS/DSCN MKR DOCD: CPT | Performed by: FAMILY MEDICINE

## 2024-03-06 PROCEDURE — 99214 OFFICE O/P EST MOD 30 MIN: CPT | Performed by: FAMILY MEDICINE

## 2024-03-06 PROCEDURE — G0439 PPPS, SUBSEQ VISIT: HCPCS | Performed by: FAMILY MEDICINE

## 2024-03-06 RX ORDER — ATORVASTATIN CALCIUM 80 MG/1
80 TABLET, FILM COATED ORAL NIGHTLY
Qty: 90 TABLET | Refills: 1 | Status: SHIPPED | OUTPATIENT
Start: 2024-03-06

## 2024-03-06 RX ORDER — TIRZEPATIDE 5 MG/.5ML
5 INJECTION, SOLUTION SUBCUTANEOUS
Qty: 2 ML | Refills: 0
Start: 2024-03-06 | End: 2024-04-05

## 2024-03-06 RX ORDER — SERTRALINE HYDROCHLORIDE 100 MG/1
100 TABLET, FILM COATED ORAL NIGHTLY
Qty: 90 TABLET | Refills: 1 | Status: SHIPPED | OUTPATIENT
Start: 2024-03-06 | End: 2024-09-02

## 2024-03-06 RX ORDER — LEVOTHYROXINE SODIUM 150 UG/1
150 TABLET ORAL DAILY
Qty: 90 TABLET | Refills: 1 | Status: SHIPPED | OUTPATIENT
Start: 2024-03-06 | End: 2024-06-04

## 2024-03-06 RX ORDER — VALSARTAN 80 MG/1
80 TABLET ORAL DAILY
Qty: 90 TABLET | Refills: 1 | Status: SHIPPED | OUTPATIENT
Start: 2024-03-06 | End: 2025-03-06

## 2024-03-06 ASSESSMENT — ACTIVITIES OF DAILY LIVING (ADL)
BATHING: INDEPENDENT
DRESSING: INDEPENDENT
GROCERY_SHOPPING: INDEPENDENT
MANAGING_FINANCES: INDEPENDENT
DOING_HOUSEWORK: INDEPENDENT
TAKING_MEDICATION: INDEPENDENT

## 2024-03-06 ASSESSMENT — ENCOUNTER SYMPTOMS
OCCASIONAL FEELINGS OF UNSTEADINESS: 1
LOSS OF SENSATION IN FEET: 1
DEPRESSION: 0

## 2024-03-06 ASSESSMENT — PATIENT HEALTH QUESTIONNAIRE - PHQ9
2. FEELING DOWN, DEPRESSED OR HOPELESS: NOT AT ALL
1. LITTLE INTEREST OR PLEASURE IN DOING THINGS: NOT AT ALL
SUM OF ALL RESPONSES TO PHQ9 QUESTIONS 1 AND 2: 0

## 2024-03-06 NOTE — ASSESSMENT & PLAN NOTE
Stable  Recommending home blood pressure monitoring.  Reviewed use.  Checking fasting labs.  Refilling meds at same dose.  Recheck in 6 months

## 2024-03-06 NOTE — PROGRESS NOTES
"Subjective   Reason for Visit: Rajni Casiano is an 63 y.o. female here for a Medicare Wellness visit.     Past Medical, Surgical, and Family History reviewed and updated in chart.    Reviewed all medications by prescribing practitioner or clinical pharmacist (such as prescriptions, OTCs, herbal therapies and supplements) and documented in the medical record.    Here for general check and wellness exam.  Did not complete labs from last visit.     Taking medications daily for hypertension hyperlipidemia diabetes, hypothyroidism, chronic pain, depression and neuopathy, narcolepsy.    Seeing endocrinology, neurology and cardiology yearly  Home BS checks are getting better  No home BP checks  Weight is decreasing  Not trying, cooking is better  Cooking more and eating less junk  Custody of 2 small kids, age 2 and 3  Started on Mounjaro    Sleeping well  Getting eye exams regular.   Denies chest pain, shortness of breath, lightheaded, dizziness or headaches.   Feeling well.    Feel a week ago, was pulled over by the 3 yr old   Landed on her knee  No bruising but swelling  Has been icing occ advil      PAP- Hysterectomy  MAMM- 4/22  Bone density 3/22  Colon cancer screening with Cologuard 3/21            Patient Care Team:  Zeny Lindsey Pla, DO as PCP - General  Zeny Lindsey Pla, DO as PCP - [facility] ACO Attributed Provider     Review of Systems    Objective   Vitals:  /70   Pulse 61   Temp 36.6 °C (97.8 °F)   Resp 16   Ht 1.6 m (5' 3\")   Wt 102 kg (224 lb)   SpO2 96%   BMI 39.68 kg/m²       Physical Exam  Vitals and nursing note reviewed.   Constitutional:       Appearance: Normal appearance.   Cardiovascular:      Rate and Rhythm: Normal rate and regular rhythm.   Pulmonary:      Effort: Pulmonary effort is normal.      Breath sounds: Normal breath sounds.   Musculoskeletal:      Cervical back: Normal range of motion.   Neurological:      Mental Status: She is alert.   Psychiatric:         Mood and " Affect: Mood normal.         Behavior: Behavior normal.         Thought Content: Thought content normal.         Judgment: Judgment normal.       Medicare Wellness Billing Compliance Satisfied    *This is a visual tool to show completion of required items on the day of the visit. Green checks will only appear on the date of visit.    Review all medications by prescribing practitioner or clinical pharmacist (such as prescriptions, OTCs, herbal therapies and supplements) documented in the medical record    Past Medical, Surgical, and Family History reviewed and updated in chart    Tobacco Use Reviewed    Alcohol Use Reviewed    Illicit Drug Use Reviewed    PHQ2/9    Falls in Last Year Reviewed    Home Safety Risk Factors Reviewed    Cognitive Impairment Reviewed    Patient Self Assessment and Health Status    Current Diet Reviewed    Exercise Frequency    ADL - Hearing Impairment    ADL - Bathing    ADL - Dressing    ADL - Walks in Home    IADL - Managing Finances    IADL - Grocery Shopping    IADL - Taking Medications    IADL - Doing Housework      Assessment/Plan   Problem List Items Addressed This Visit       Depression, major, single episode, moderate (CMS/HCC)     Stable  Refilling medication at same dose         Relevant Medications    sertraline (Zoloft) 100 mg tablet    Hyperlipidemia     Checking fasting labs and adjust meds accordingly         Relevant Medications    atorvastatin (Lipitor) 80 mg tablet    Other Relevant Orders    Comprehensive Metabolic Panel    Lipid Panel    Essential hypertension     Stable  Recommending home blood pressure monitoring.  Reviewed use.  Checking fasting labs.  Refilling meds at same dose.  Recheck in 6 months         Relevant Medications    valsartan (Diovan) 80 mg tablet    Other Relevant Orders    CBC and Auto Differential    Hypothyroidism     Checking TSH level and adjust meds accordingly         Relevant Medications    levothyroxine (Synthroid,  Levoxyl) 150 mcg tablet    Other Relevant Orders    TSH with reflex to Free T4 if abnormal    Morbid obesity (CMS/[facility])     Uncontrolled  Reviewed diet.  Continue weight loss efforts         Narcolepsy     Continue care per neurology         Polyneuropathy in diabetes (CMS/[facility])     Stable  Continue present meds         Obstructive sleep apnea     Stable  Continue to monitor         Diabetes mellitus (CMS/[facility])     Continue care per endocrinology         Relevant Orders    Hemoglobin A1C    Albumin , Urine Random    Type 2 diabetes mellitus with mild nonproliferative diabetic retinopathy without macular edema, bilateral (CMS/[facility])     Checking A1c level  Continue care per endocrinology         Relevant Medications    tirzepatide (Mounjaro) 5 mg/0.5 mL pen injector    Other Relevant Orders    Hemoglobin A1C    Albumin , Urine Random    Mixed stress and urge urinary incontinence     Stable  Change in medication.  Continue to monitor         Medicare annual wellness visit, subsequent - Primary     Schedule fasting labs, mammogram and bone density.  Complete Cologuard card colon cancer screening.  Immunizations up-to-date          Other Visit Diagnoses       Encounter for screening mammogram for malignant neoplasm of breast        Relevant Orders    BI mammo bilateral screening tomosynthesis    Menopause        Relevant Orders    XR DEXA bone density    Colon cancer screening        Relevant Orders    Cologuard® colon cancer screening    Anxiety and depression        Relevant Medications    sertraline (Zoloft) 100 mg tablet    Primary hypertension        Relevant Medications    valsartan (Diovan) 80 mg tablet    Screening for viral disease        Relevant Orders    Hepatitis C Antibody    Acute pain of right knee        reassurance given   Conitnue supportive care  Return if not improving,

## 2024-03-06 NOTE — ASSESSMENT & PLAN NOTE
Schedule fasting labs, mammogram and bone density.  Complete Cologuard card colon cancer screening.  Immunizations up-to-date

## 2024-03-12 ENCOUNTER — LAB (OUTPATIENT)
Dept: LAB | Facility: LAB | Age: 64
End: 2024-03-12
Payer: MEDICARE

## 2024-03-12 ENCOUNTER — HOSPITAL ENCOUNTER (OUTPATIENT)
Dept: RADIOLOGY | Facility: CLINIC | Age: 64
Discharge: HOME | End: 2024-03-12
Payer: MEDICARE

## 2024-03-12 VITALS — BODY MASS INDEX: 39.84 KG/M2 | WEIGHT: 224.87 LBS | HEIGHT: 63 IN

## 2024-03-12 DIAGNOSIS — E06.3 HYPOTHYROIDISM DUE TO HASHIMOTO'S THYROIDITIS: ICD-10-CM

## 2024-03-12 DIAGNOSIS — Z11.59 SCREENING FOR VIRAL DISEASE: ICD-10-CM

## 2024-03-12 DIAGNOSIS — Z12.31 ENCOUNTER FOR SCREENING MAMMOGRAM FOR MALIGNANT NEOPLASM OF BREAST: ICD-10-CM

## 2024-03-12 DIAGNOSIS — E78.2 MIXED HYPERLIPIDEMIA: ICD-10-CM

## 2024-03-12 DIAGNOSIS — I10 ESSENTIAL HYPERTENSION: ICD-10-CM

## 2024-03-12 DIAGNOSIS — E11.3293 TYPE 2 DIABETES MELLITUS WITH MILD NONPROLIFERATIVE DIABETIC RETINOPATHY WITHOUT MACULAR EDEMA, BILATERAL (MULTI): Primary | ICD-10-CM

## 2024-03-12 DIAGNOSIS — E03.8 HYPOTHYROIDISM DUE TO HASHIMOTO'S THYROIDITIS: ICD-10-CM

## 2024-03-12 DIAGNOSIS — Z78.0 MENOPAUSE: ICD-10-CM

## 2024-03-12 DIAGNOSIS — E11.3293 TYPE 2 DIABETES MELLITUS WITH BOTH EYES AFFECTED BY MILD NONPROLIFERATIVE RETINOPATHY WITHOUT MACULAR EDEMA, WITHOUT LONG-TERM CURRENT USE OF INSULIN (MULTI): ICD-10-CM

## 2024-03-12 LAB
ALBUMIN SERPL BCP-MCNC: 4 G/DL (ref 3.4–5)
ALP SERPL-CCNC: 99 U/L (ref 33–136)
ALT SERPL W P-5'-P-CCNC: 27 U/L (ref 7–45)
ANION GAP SERPL CALC-SCNC: 16 MMOL/L (ref 10–20)
AST SERPL W P-5'-P-CCNC: 28 U/L (ref 9–39)
BASOPHILS # BLD AUTO: 0.07 X10*3/UL (ref 0–0.1)
BASOPHILS NFR BLD AUTO: 1 %
BILIRUB SERPL-MCNC: 0.6 MG/DL (ref 0–1.2)
BUN SERPL-MCNC: 17 MG/DL (ref 6–23)
CALCIUM SERPL-MCNC: 9.9 MG/DL (ref 8.6–10.6)
CHLORIDE SERPL-SCNC: 102 MMOL/L (ref 98–107)
CHOLEST SERPL-MCNC: 148 MG/DL (ref 0–199)
CHOLESTEROL/HDL RATIO: 3.4
CO2 SERPL-SCNC: 29 MMOL/L (ref 21–32)
CREAT SERPL-MCNC: 0.93 MG/DL (ref 0.5–1.05)
EGFRCR SERPLBLD CKD-EPI 2021: 68 ML/MIN/1.73M*2
EOSINOPHIL # BLD AUTO: 0.44 X10*3/UL (ref 0–0.7)
EOSINOPHIL NFR BLD AUTO: 6.2 %
ERYTHROCYTE [DISTWIDTH] IN BLOOD BY AUTOMATED COUNT: 13.3 % (ref 11.5–14.5)
EST. AVERAGE GLUCOSE BLD GHB EST-MCNC: 203 MG/DL
GLUCOSE SERPL-MCNC: 213 MG/DL (ref 74–99)
HBA1C MFR BLD: 8.7 %
HCT VFR BLD AUTO: 44.7 % (ref 36–46)
HCV AB SER QL: NONREACTIVE
HDLC SERPL-MCNC: 43.4 MG/DL
HGB BLD-MCNC: 14 G/DL (ref 12–16)
IMM GRANULOCYTES # BLD AUTO: 0.01 X10*3/UL (ref 0–0.7)
IMM GRANULOCYTES NFR BLD AUTO: 0.1 % (ref 0–0.9)
LDLC SERPL CALC-MCNC: 75 MG/DL
LYMPHOCYTES # BLD AUTO: 1.64 X10*3/UL (ref 1.2–4.8)
LYMPHOCYTES NFR BLD AUTO: 23.1 %
MCH RBC QN AUTO: 29.2 PG (ref 26–34)
MCHC RBC AUTO-ENTMCNC: 31.3 G/DL (ref 32–36)
MCV RBC AUTO: 93 FL (ref 80–100)
MONOCYTES # BLD AUTO: 0.38 X10*3/UL (ref 0.1–1)
MONOCYTES NFR BLD AUTO: 5.3 %
NEUTROPHILS # BLD AUTO: 4.57 X10*3/UL (ref 1.2–7.7)
NEUTROPHILS NFR BLD AUTO: 64.3 %
NON HDL CHOLESTEROL: 105 MG/DL (ref 0–149)
NRBC BLD-RTO: 0 /100 WBCS (ref 0–0)
PLATELET # BLD AUTO: 187 X10*3/UL (ref 150–450)
POTASSIUM SERPL-SCNC: 4.5 MMOL/L (ref 3.5–5.3)
PROT SERPL-MCNC: 7.2 G/DL (ref 6.4–8.2)
RBC # BLD AUTO: 4.8 X10*6/UL (ref 4–5.2)
SODIUM SERPL-SCNC: 142 MMOL/L (ref 136–145)
T4 FREE SERPL-MCNC: 1.42 NG/DL (ref 0.78–1.48)
TRIGL SERPL-MCNC: 150 MG/DL (ref 0–149)
TSH SERPL-ACNC: 0.1 MIU/L (ref 0.44–3.98)
VLDL: 30 MG/DL (ref 0–40)
WBC # BLD AUTO: 7.1 X10*3/UL (ref 4.4–11.3)

## 2024-03-12 PROCEDURE — 77063 BREAST TOMOSYNTHESIS BI: CPT | Performed by: RADIOLOGY

## 2024-03-12 PROCEDURE — 77067 SCR MAMMO BI INCL CAD: CPT | Performed by: RADIOLOGY

## 2024-03-12 PROCEDURE — 77067 SCR MAMMO BI INCL CAD: CPT

## 2024-03-12 PROCEDURE — 84443 ASSAY THYROID STIM HORMONE: CPT

## 2024-03-12 PROCEDURE — 77080 DXA BONE DENSITY AXIAL: CPT

## 2024-03-12 PROCEDURE — 36415 COLL VENOUS BLD VENIPUNCTURE: CPT

## 2024-03-12 PROCEDURE — 86803 HEPATITIS C AB TEST: CPT

## 2024-03-12 PROCEDURE — 83036 HEMOGLOBIN GLYCOSYLATED A1C: CPT

## 2024-03-12 PROCEDURE — 80053 COMPREHEN METABOLIC PANEL: CPT

## 2024-03-12 PROCEDURE — 84439 ASSAY OF FREE THYROXINE: CPT

## 2024-03-12 PROCEDURE — 80061 LIPID PANEL: CPT

## 2024-03-12 PROCEDURE — 85025 COMPLETE CBC W/AUTO DIFF WBC: CPT

## 2024-03-13 DIAGNOSIS — E06.3 HYPOTHYROIDISM DUE TO HASHIMOTO'S THYROIDITIS: Primary | ICD-10-CM

## 2024-03-13 DIAGNOSIS — E03.8 HYPOTHYROIDISM DUE TO HASHIMOTO'S THYROIDITIS: Primary | ICD-10-CM

## 2024-03-13 RX ORDER — LEVOTHYROXINE SODIUM 125 UG/1
125 TABLET ORAL DAILY
Qty: 30 TABLET | Refills: 1 | Status: SHIPPED | OUTPATIENT
Start: 2024-03-13 | End: 2025-03-13

## 2024-03-18 ENCOUNTER — TELEPHONE (OUTPATIENT)
Dept: PRIMARY CARE | Facility: CLINIC | Age: 64
End: 2024-03-18
Payer: MEDICARE

## 2024-03-18 DIAGNOSIS — B37.31 YEAST VAGINITIS: Primary | ICD-10-CM

## 2024-03-18 RX ORDER — FLUCONAZOLE 150 MG/1
150 TABLET ORAL ONCE
Qty: 1 TABLET | Refills: 0 | Status: SHIPPED | OUTPATIENT
Start: 2024-03-18 | End: 2024-03-18

## 2024-03-20 ENCOUNTER — OFFICE VISIT (OUTPATIENT)
Dept: OBSTETRICS AND GYNECOLOGY | Facility: CLINIC | Age: 64
End: 2024-03-20
Payer: MEDICARE

## 2024-03-20 VITALS
DIASTOLIC BLOOD PRESSURE: 70 MMHG | WEIGHT: 225 LBS | HEART RATE: 57 BPM | HEIGHT: 63 IN | BODY MASS INDEX: 39.87 KG/M2 | SYSTOLIC BLOOD PRESSURE: 120 MMHG

## 2024-03-20 DIAGNOSIS — N39.3 SUI (STRESS URINARY INCONTINENCE, FEMALE): Primary | ICD-10-CM

## 2024-03-20 DIAGNOSIS — N32.81 OVERACTIVE BLADDER: ICD-10-CM

## 2024-03-20 PROCEDURE — 1157F ADVNC CARE PLAN IN RCRD: CPT | Performed by: NURSE PRACTITIONER

## 2024-03-20 PROCEDURE — 1160F RVW MEDS BY RX/DR IN RCRD: CPT | Performed by: NURSE PRACTITIONER

## 2024-03-20 PROCEDURE — 1036F TOBACCO NON-USER: CPT | Performed by: NURSE PRACTITIONER

## 2024-03-20 PROCEDURE — 3074F SYST BP LT 130 MM HG: CPT | Performed by: NURSE PRACTITIONER

## 2024-03-20 PROCEDURE — 3048F LDL-C <100 MG/DL: CPT | Performed by: NURSE PRACTITIONER

## 2024-03-20 PROCEDURE — 4010F ACE/ARB THERAPY RXD/TAKEN: CPT | Performed by: NURSE PRACTITIONER

## 2024-03-20 PROCEDURE — 99212 OFFICE O/P EST SF 10 MIN: CPT | Performed by: NURSE PRACTITIONER

## 2024-03-20 PROCEDURE — 1123F ACP DISCUSS/DSCN MKR DOCD: CPT | Performed by: NURSE PRACTITIONER

## 2024-03-20 PROCEDURE — 3052F HG A1C>EQUAL 8.0%<EQUAL 9.0%: CPT | Performed by: NURSE PRACTITIONER

## 2024-03-20 PROCEDURE — 3078F DIAST BP <80 MM HG: CPT | Performed by: NURSE PRACTITIONER

## 2024-03-20 PROCEDURE — 1159F MED LIST DOCD IN RCRD: CPT | Performed by: NURSE PRACTITIONER

## 2024-03-20 PROCEDURE — 1126F AMNT PAIN NOTED NONE PRSNT: CPT | Performed by: NURSE PRACTITIONER

## 2024-03-20 ASSESSMENT — ENCOUNTER SYMPTOMS
GASTROINTESTINAL NEGATIVE: 1
EYES NEGATIVE: 1
ENDOCRINE NEGATIVE: 1
CARDIOVASCULAR NEGATIVE: 1
MUSCULOSKELETAL NEGATIVE: 1
PSYCHIATRIC NEGATIVE: 1
RESPIRATORY NEGATIVE: 1
CONSTITUTIONAL NEGATIVE: 1
NEUROLOGICAL NEGATIVE: 1

## 2024-03-20 ASSESSMENT — PAIN SCALES - GENERAL: PAINLEVEL: 0-NO PAIN

## 2024-03-20 NOTE — PROGRESS NOTES
Subjective   Patient ID: Rajni Casiano is a 63 y.o. female who presents for FUV (Pt. Is unable to void).    HPI  63 year old female following up on OAB.     Records Review:  - Patient previously had intravesical Botox of 100 U on 6/28/2023 and repeat Botox at increased dose of 130 U on 12/11/2023. She has previously tried Solifenacin which she failed due to no symptom improvement. The patient started on Myrbetriq and this failed to improve her bladder symptoms.   - 3/6/2023 Dr. Rosa note RE: UDN TESTING: Pt did not complete a uroflow. PVR prior to test was 150 mL. Pt placed in birthing chair and filled at 50 ml/min using aircharged catheters at a 45 degree angle. XA=865 mL, CM=841 mL, and max srjqlsxv=869 mL. (+) VLPP at 392 mL filling at 86 cm H2O. No DO. MUCPs= 26 and 16 cm H2O. Pt allowed to void. Normal voiding with approp detrusor contraction. FINAL DIAGNOSIS: (+) TAVARES, No DO, low normal MUCPs, normal voiding  - 8/16/2022 procedure: Midurethral sling and cystoscopy.     Urinary Symptoms:  - The patient states that she failed Myrbetriq as it did not improve her OAB symptoms.   - Reports the increased dose of Botox at 130 U in December 2023 did not improve her OAB symptoms.   - Denies having any pacemaker implants. She does have a CGM.   - She continues to endorse nocturnal enuresis, UUI at night, and UUI during the day.   - The patient has previously tried an anti-incontinence pessary in the past with failure to improve her urinary leakage.   - Endorses TVAARES described as leaking urine with positional changes from sitting to standing positions.       Review of Systems   Constitutional: Negative.    HENT: Negative.     Eyes: Negative.    Respiratory: Negative.     Cardiovascular: Negative.    Gastrointestinal: Negative.    Endocrine: Negative.    Genitourinary: Negative.    Musculoskeletal: Negative.    Neurological: Negative.    Psychiatric/Behavioral: Negative.         Objective   Physical Exam  Constitutional:        General: She is not in acute distress.     Appearance: Normal appearance.   HENT:      Head: Normocephalic and atraumatic.   Pulmonary:      Effort: Pulmonary effort is normal.   Neurological:      Mental Status: She is alert and oriented to person, place, and time.   Psychiatric:         Attention and Perception: Attention and perception normal.         Mood and Affect: Mood and affect normal.         Speech: Speech normal.         Behavior: Behavior normal.         Thought Content: Thought content normal.         Cognition and Memory: Cognition normal.         Judgment: Judgment normal.         Assessment/Plan   63 year old female with YINA with urge and stress. Comorbidities include: CAD s/p CABG, type 2 diabetes, polyneuropathy, idiopathic hypersomnia, narcolepsy, and GODWIN.     Diagnoses:  #1 Mixed urinary incontinence, urge and stress     Plan:  1. OAB  - PVR = 9mL by bladder U/S.  - Patient previously had intravesical Botox of 100 U on 6/28/2023 and repeat Botox at increased dose of 130 U on 12/11/2023. She has previously tried Solifenacin and Myrbetriq which she failed due to no symptom improvement.   - She most recently received intradetrusor Botox injection of 130 U and cystoscopy with Dr. Rosa on 12/11/2023.   - We discussed that given the new regulations on Botox dosage of 100 U every 3 months that we cannot offer her an increased dose and the only alternative OAB treatment option to consider is SNM ([employer]).   - Planning to address urinary leakage with Bulkamid as she has failed Botox and several medications for OAB; based on UDN results she has +TAVARES only and no DO.    2. TAVARES  - 8/16/2022 procedure: Midurethral sling and cystoscopy with Dr. Rosa.   - Previously failed an anti-incontinence pessary as she continued to endorse urinary leakage.  - Reviewed previous UDS results from 3/6/2023 which showed (+) TAVARES, no DO, low normal MUCPs, and normal voiding.   - We discussed that based on her UDS  testing consideration of pursuing TAVARES management with Bulkamid or TVT.   - Discussed the risks, benefits, surgical steps, and success rate of the periurethral bulking. Bulkamid is injecting a polyacrylamide/water gel material around the urethra to help provide the urethra with support. The data shows that at 8 years after patients received Bulkamid injections that had TAVARES that was 92% improved or cured. The benefits include no mesh is involved in this surgery so there is no risk of mesh erosion and there is virtually no recovery time with this procedure. The risks include a 20-40% chance of needing a subsequent top up Bulkamid procedure in the future and top-up procedures can be arranged as soon as 6 weeks after initial Bulkamid injection if the patient experiences persistent TAVARES sx. There is also a risk of urinary retention in which we would place a catheter overnight to allow the silicone to mold around the urethra to completely resolve POUR.   - Gave her PI handout on Bulkamid to review and consider.     Follow up in 2 weeks via virtual visit with Dr. Rosa to schedule Bulkamid.     BRYANT Garcia 03/20/24 3:36 PM     Scribe Attestation  By signing my name below, IJaden Scribe, attest that this documentation has been prepared under the direction and in the presence of BRYANT Garcia on 03/20/2024 at 5:50 PM.   I, BRYANT Garcia, personally performed the services described in this documentation which was scribed virtually and I confirm that it is both accurate and complete.

## 2024-03-22 DIAGNOSIS — G47.419 PRIMARY NARCOLEPSY WITHOUT CATAPLEXY (HHS-HCC): ICD-10-CM

## 2024-03-22 DIAGNOSIS — G47.11 IDIOPATHIC HYPERSOMNIA: ICD-10-CM

## 2024-03-22 RX ORDER — METHYLPHENIDATE HYDROCHLORIDE 20 MG/1
20 TABLET ORAL 2 TIMES DAILY
Qty: 60 TABLET | Refills: 0 | Status: SHIPPED | OUTPATIENT
Start: 2024-03-22 | End: 2024-05-28 | Stop reason: SDUPTHER

## 2024-04-06 LAB — NONINV COLON CA DNA+OCC BLD SCRN STL QL: NORMAL

## 2024-04-21 NOTE — PROGRESS NOTES
Urogynecology  Provider:  Silvia Rosa MD  [phone]              ASSESSMENT AND PLAN:     Problem List Items Addressed This Visit    None  Visit Diagnoses       Overactive bladder    -  Primary    Relevant Medications    vibegron (Gemtesa) 75 mg tablet                I spent a total of 6 minutes in face to face and non face to face time at this virtual visit.      Agree with above. I Dr. Rosa, personally performed the services described in the documentation which was scribed virtually and confirm it is both complete and accurate.  Silvia Rosa MD    10 min total    Silvia Rosa MD        HISTORY OF PRESENT ILLNESS:   Rajni Casiano is a 63 y.o. female who presents today for a  follow-up regarding her OAB. She was last evaluated by me on 12/11/2023 for cystoscopy and intradetrusor Botox injections (130 U). She is overall doing not well. She states during the day she does fairly well, but at night she is struggling with incontinence. She reports at night she will wake with leakage and by the time she has walked to the restroom her pad is full. This is happening around 4 times per night. She is still taking Solifenacin daily. She states her last Botox procedure has not helped, and she has had three rounds of injections. The patient has tried Myrbetriq alone and in combination with Solifenacin without successes either way. She has never been prescribed Gemtesa.    Past Medical History:       Past Medical History:   Diagnosis Date    Narcolepsy without cataplexy ([facility])     Narcolepsy    Obstructive sleep apnea (adult) (pediatric)     Obstructive sleep apnea    Personal history of other diseases of the circulatory system     History of hypertension    Personal history of other endocrine, nutritional and metabolic disease     History of hypercholesterolemia    Personal history of other endocrine, nutritional and metabolic disease     History of hypothyroidism    Personal history of other endocrine,  nutritional and metabolic disease     History of diabetes mellitus          Past Surgical History:       Past Surgical History:   Procedure Laterality Date    CATARACT EXTRACTION  07/03/2013    Cataract Surgery    CORONARY ARTERY BYPASS GRAFT  01/12/2017    CABG    HYSTERECTOMY  07/03/2013    Hysterectomy    NECK SURGERY  07/03/2013    Neck Surgery         Medications:       Prior to Admission medications    Medication Sig Start Date End Date Taking? Authorizing Provider   atorvastatin (Lipitor) 80 mg tablet Take 1 tablet (80 mg) by mouth once daily at bedtime. 3/6/24   Zeny MICHELLE Rosalia Hong, DO   gabapentin (Neurontin) 300 mg capsule Take 1 capsule (300 mg) by mouth once daily at bedtime. 11/2/23 4/30/24  BRYANT Rodriguez   Gvoke PFS 2-Pack Syringe 1 mg/0.2 mL syringe INJECT 1 MG SUBCUTANEOUSLY AS NEEDED FOR HYPOGLYCEMIA. IF BLOOD GLUCOSE NOT RESPONDING IN 15 MINUTES  REPEAT WITH ANOTHER DOSE 6/27/23   Historical Provider, MD   insulin regular (HumuLIN R U-500) 500 unit/mL CONCENTRATED injection .25 mL brkfst, .25 mL lunch, .10 mL dinner Subcutaneous with meals for 90 days    Historical Provider, MD   levothyroxine (Synthroid, Levoxyl) 125 mcg tablet Take 1 tablet (125 mcg) by mouth once daily. 3/13/24 3/13/25  Zeny MARTINEZ Rosalia Pitts, DO   levothyroxine (Synthroid, Levoxyl) 150 mcg tablet Take 1 tablet (150 mcg) by mouth once daily. 3/6/24 6/4/24  Zeny MARTINEZ Rosalia Hong, DO   methylphenidate (Ritalin) 20 mg tablet Take 1 tablet (20 mg) by mouth 2 times a day. 3/22/24 4/21/24  BRYANT Rodriguez   metoprolol tartrate (Lopressor) 25 mg tablet Take 1 tablet (25 mg) by mouth 2 times a day. 11/16/23 11/10/24  BRYANT Bah   sertraline (Zoloft) 100 mg tablet Take 1 tablet (100 mg) by mouth once daily at bedtime. 3/6/24 9/2/24  Zeny MARTINEZ Rosalia Pitts, DO   solifenacin (VESIcare) 10 mg tablet TAKE ONE TABLET BY MOUTH EVERY DAY 4/16/24   Edita B Bottiggi, APRN-CNP   valsartan (Diovan) 80 mg tablet  Take 1 tablet (80 mg) by mouth once daily. 3/6/24 3/6/25  Zeny MARTINEZ Rosalia Hong, DO        ROS  Review of Systems   Constitutional: Negative.    HENT: Negative.     Eyes: Negative.    Respiratory: Negative.     Cardiovascular: Negative.    Gastrointestinal: Negative.    Endocrine: Negative.    Genitourinary:  Positive for frequency and urgency.   Musculoskeletal: Negative.    Neurological: Negative.    Psychiatric/Behavioral: Negative.        PE: Not performed due to TH visit    Data and DIAGNOSTIC STUDIES REVIEWED   No results found.   Lab Results   Component Value Date    URINECULTURE NO GROWTH 08/28/2023      Lab Results   Component Value Date    GLUCOSE 213 (H) 03/12/2024    CALCIUM 9.9 03/12/2024     03/12/2024    K 4.5 03/12/2024    CO2 29 03/12/2024     03/12/2024    BUN 17 03/12/2024    CREATININE 0.93 03/12/2024     Lab Results   Component Value Date    WBC 7.1 03/12/2024    HGB 14.0 03/12/2024    HCT 44.7 03/12/2024    MCV 93 03/12/2024     03/12/2024      Assessment:   Rajni Casiano is a 63 y.o. female who struggles with OAB. The patient has failed multiple treatment plans including: Solifenacin, Myrbetriq, and cytoscopic intradetrusor Botox injections.     Diagnosis List:   1. OAB    Plan:  1. OAB  - Discussed consideration of pursuing sacral neuromodulation (SNM) for management of her OAB. Reviewed the procedure for sacral neuromodulation and discussed it is a staged procedure which allows us to determine degree of symptom improvement during the PNE trial prior to implanting permanent device.  We counseled her on the risks and steps of the PNE in office placement where we would inject Lidocaine for localized anesthesia and based on the landmarks of her back we will place the lead stimulator into the S3 foramen to stimulate the pudendal nerve in hopes of improving OAB. We would give her an antibiotic after the procedure to prevent infection. The risks include discomfort, bleeding,  infection, and since we placing the lead based off landmarks there is a chance of not placing the lead into the correct location. If we do not get a good response from the PNE trial we could consider placing the lead in the OR with fluoroscopic X-ray to ensure that the lead is placed into the S3 foramen. During the PNE trial period (i.e. around x5 days) she will keep a bladder diary to keep track of her symptoms to determine if she has had 50% or greater in her symptom improvement. Reviewed that if symptoms are at least 50% improved during the trial period we would implant the permanent device in the OR under sedation.  - Stop Solifenacin 10 mg QD  - Start Gemtesa 75 mg QD    Follow up in 2 months.    Scribe Attestation  By signing my name below, I, Brain Rosen, attest that this documentation has been prepared under the direction and in the presence of Silvia Rosa MD on 04/22/2024 at 9:40 AM.

## 2024-04-22 ENCOUNTER — TELEMEDICINE (OUTPATIENT)
Dept: OBSTETRICS AND GYNECOLOGY | Facility: CLINIC | Age: 64
End: 2024-04-22
Payer: MEDICARE

## 2024-04-22 DIAGNOSIS — N32.81 OVERACTIVE BLADDER: Primary | ICD-10-CM

## 2024-04-22 PROCEDURE — 4010F ACE/ARB THERAPY RXD/TAKEN: CPT | Performed by: OBSTETRICS & GYNECOLOGY

## 2024-04-22 PROCEDURE — 99212 OFFICE O/P EST SF 10 MIN: CPT | Performed by: OBSTETRICS & GYNECOLOGY

## 2024-04-22 PROCEDURE — 3048F LDL-C <100 MG/DL: CPT | Performed by: OBSTETRICS & GYNECOLOGY

## 2024-04-22 PROCEDURE — 3052F HG A1C>EQUAL 8.0%<EQUAL 9.0%: CPT | Performed by: OBSTETRICS & GYNECOLOGY

## 2024-04-22 PROCEDURE — 1160F RVW MEDS BY RX/DR IN RCRD: CPT | Performed by: OBSTETRICS & GYNECOLOGY

## 2024-04-22 PROCEDURE — 1157F ADVNC CARE PLAN IN RCRD: CPT | Performed by: OBSTETRICS & GYNECOLOGY

## 2024-04-22 PROCEDURE — 1123F ACP DISCUSS/DSCN MKR DOCD: CPT | Performed by: OBSTETRICS & GYNECOLOGY

## 2024-04-22 PROCEDURE — 1159F MED LIST DOCD IN RCRD: CPT | Performed by: OBSTETRICS & GYNECOLOGY

## 2024-04-22 RX ORDER — VIBEGRON 75 MG/1
1 TABLET, FILM COATED ORAL DAILY
Qty: 30 TABLET | Refills: 3 | Status: SHIPPED | OUTPATIENT
Start: 2024-04-22

## 2024-04-28 ASSESSMENT — ENCOUNTER SYMPTOMS
GASTROINTESTINAL NEGATIVE: 1
ENDOCRINE NEGATIVE: 1
CARDIOVASCULAR NEGATIVE: 1
FREQUENCY: 1
RESPIRATORY NEGATIVE: 1
EYES NEGATIVE: 1
NEUROLOGICAL NEGATIVE: 1
CONSTITUTIONAL NEGATIVE: 1
MUSCULOSKELETAL NEGATIVE: 1
PSYCHIATRIC NEGATIVE: 1

## 2024-05-24 ENCOUNTER — TELEPHONE (OUTPATIENT)
Dept: NEUROLOGY | Facility: HOSPITAL | Age: 64
End: 2024-05-24
Payer: MEDICARE

## 2024-05-24 DIAGNOSIS — G47.11 IDIOPATHIC HYPERSOMNIA: ICD-10-CM

## 2024-05-24 DIAGNOSIS — G47.419 PRIMARY NARCOLEPSY WITHOUT CATAPLEXY (HHS-HCC): ICD-10-CM

## 2024-05-24 NOTE — TELEPHONE ENCOUNTER
Pt requests refill of Ritalin 20 mg BID please be sent to [facility].    Last visit 11/2/23  Next visit 6/3/24    Thank you.

## 2024-05-28 RX ORDER — METHYLPHENIDATE HYDROCHLORIDE 20 MG/1
20 TABLET ORAL 2 TIMES DAILY
Qty: 60 TABLET | Refills: 0 | Status: SHIPPED | OUTPATIENT
Start: 2024-05-28 | End: 2024-06-03 | Stop reason: SDUPTHER

## 2024-06-03 ENCOUNTER — TELEMEDICINE (OUTPATIENT)
Dept: NEUROLOGY | Facility: HOSPITAL | Age: 64
End: 2024-06-03
Payer: MEDICARE

## 2024-06-03 DIAGNOSIS — G47.419 PRIMARY NARCOLEPSY WITHOUT CATAPLEXY (HHS-HCC): ICD-10-CM

## 2024-06-03 DIAGNOSIS — E11.42 DIABETIC POLYNEUROPATHY ASSOCIATED WITH TYPE 2 DIABETES MELLITUS (MULTI): ICD-10-CM

## 2024-06-03 DIAGNOSIS — G47.11 IDIOPATHIC HYPERSOMNIA: Primary | ICD-10-CM

## 2024-06-03 DIAGNOSIS — G25.81 RESTLESS LEGS SYNDROME: ICD-10-CM

## 2024-06-03 DIAGNOSIS — G47.33 OBSTRUCTIVE SLEEP APNEA: ICD-10-CM

## 2024-06-03 PROCEDURE — 1159F MED LIST DOCD IN RCRD: CPT | Performed by: NURSE PRACTITIONER

## 2024-06-03 PROCEDURE — 4010F ACE/ARB THERAPY RXD/TAKEN: CPT | Performed by: NURSE PRACTITIONER

## 2024-06-03 PROCEDURE — 1123F ACP DISCUSS/DSCN MKR DOCD: CPT | Performed by: NURSE PRACTITIONER

## 2024-06-03 PROCEDURE — 99214 OFFICE O/P EST MOD 30 MIN: CPT | Mod: 95 | Performed by: NURSE PRACTITIONER

## 2024-06-03 PROCEDURE — 1157F ADVNC CARE PLAN IN RCRD: CPT | Performed by: NURSE PRACTITIONER

## 2024-06-03 PROCEDURE — 99214 OFFICE O/P EST MOD 30 MIN: CPT | Performed by: NURSE PRACTITIONER

## 2024-06-03 PROCEDURE — 3052F HG A1C>EQUAL 8.0%<EQUAL 9.0%: CPT | Performed by: NURSE PRACTITIONER

## 2024-06-03 PROCEDURE — 1036F TOBACCO NON-USER: CPT | Performed by: NURSE PRACTITIONER

## 2024-06-03 PROCEDURE — 3048F LDL-C <100 MG/DL: CPT | Performed by: NURSE PRACTITIONER

## 2024-06-03 PROCEDURE — 1160F RVW MEDS BY RX/DR IN RCRD: CPT | Performed by: NURSE PRACTITIONER

## 2024-06-03 RX ORDER — METHYLPHENIDATE HYDROCHLORIDE 20 MG/1
20 TABLET ORAL 2 TIMES DAILY
Qty: 60 TABLET | Refills: 0 | Status: SHIPPED | OUTPATIENT
Start: 2024-08-02 | End: 2024-09-01

## 2024-06-03 RX ORDER — GABAPENTIN 300 MG/1
300 CAPSULE ORAL NIGHTLY
Qty: 90 CAPSULE | Refills: 3 | Status: SHIPPED | OUTPATIENT
Start: 2024-06-03 | End: 2025-06-03

## 2024-06-03 RX ORDER — METHYLPHENIDATE HYDROCHLORIDE 20 MG/1
20 TABLET ORAL 2 TIMES DAILY
Qty: 60 TABLET | Refills: 0 | Status: SHIPPED | OUTPATIENT
Start: 2024-06-03 | End: 2024-07-03

## 2024-06-03 RX ORDER — METHYLPHENIDATE HYDROCHLORIDE 20 MG/1
20 TABLET ORAL 2 TIMES DAILY
Qty: 60 TABLET | Refills: 0 | Status: SHIPPED | OUTPATIENT
Start: 2024-07-03 | End: 2024-08-02

## 2024-06-03 NOTE — PROGRESS NOTES
[facility]    SUBJECTIVE    Rajni Casiano is a 63 y.o. right-handed female who presents with   Chief Complaint   Patient presents with    Narcolepsy        Presents for follow up visit  Visit type: virtual visit Virtual or Telephone Consent    An interactive audio and video telecommunication system which permits real time communications between the patient (at the originating site) and provider (at the distant site) was utilized to provide this telehealth service.   Verbal consent was requested and obtained from Rajni Casiano on this date, 06/03/24 for a telehealth visit.     HISTORY OF PRESENT ILLNESS    RECAP:  Former patient of Dr. Brink.      1/10/23 - Urine drug screen reviewed, no variances. Really tired in mornings. Can wake up, eat breakfast and go back to sleep for 30 min to a few hours. After that doing well until she goes to sleep around 1 am. Wakes up around 9 am.      Gabapentin is controlling her RLS well. No continued difficulty falling asleep due to RLS.     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      Most recent PSG and MSLT done at  in [place] in 2017 - indicative of idiopathic hypersomnia at that time. PLMs at 11.5. No AHI given but CPAP titrated up from pressure 7 to pressure 10 to control her AHI.      Previously on vyvanse and ropinerole.      ESS 11     Pt did try the wakix for a couple days. States she did not feel well and was having some back pain. She stopped taking it, is now feeling better and would like to try it again.      11/02/23 - Reports was doing well on ritalin, still sleepy throughout day but able to stay awake if doing something. Currently out of her medication including gabapentin as well. Feels the ritalin is similar in efficacy to the adderall for her.   Just got custody of two grandkids. One is 2.5 years old so needing to be awake to raise them.      CPAP download unavailable at this time.      ESS 17/24  today     Methylphenidate increased to 20 mg BID. Pt does have history of CAD s/p bypass per pt. States previously cleared by her cardiologist to be on stimulants. HR and BP are good. Discussed risk associated with increasing stimulant dosing, pt states understanding. UDS ordered - pt currently out of her methylphenidate. CSA signed today.     06/03/24 -     CPAP download data showed:   AirSense 11 Auto-CPAP  Date range: 2/11/24 - 2/29/24 (only data available - confirmed with [facility])  Auto-CPAP 6-20 cm H20, EPR 2  Pressure (cm H20): median 12.6, 95th percentile 16.3, and max 18.1  % used days >=4 hours: 95% of 19 days  Average daily usage (days used): 7:24 h  Median daily usage (days used): 7:27 h   Leak: not an issue  Residual AHI: 4.7     Pt is symptomatically benefiting from CPAP use.     Ran out of medication. Doing pretty well on the increase of methylphenidate.     Gabapentin working well for RLS symptoms.     Has two CPAP machines, when neville uses the other machine and staying at mom's. Was staying at her mother's every night. Currently back home now because mother is in hospital, she is not doing well.       REVIEW OF SYSTEMS:  10 point review of systems performed and is negative except as noted in the HPI.     Past Medical History:   Diagnosis Date    Narcolepsy without cataplexy ([facility])     Narcolepsy    Obstructive sleep apnea (adult) (pediatric)     Obstructive sleep apnea    Personal history of other diseases of the circulatory system     History of hypertension    Personal history of other endocrine, nutritional and metabolic disease     History of hypercholesterolemia    Personal history of other endocrine, nutritional and metabolic disease     History of hypothyroidism    Personal history of other endocrine, nutritional and metabolic disease     History of diabetes mellitus       No relevant family history has been documented for this patient.    Past Surgical History:   Procedure Laterality Date     CATARACT EXTRACTION  07/03/2013    Cataract Surgery    CORONARY ARTERY BYPASS GRAFT  01/12/2017    CABG    HYSTERECTOMY  07/03/2013    Hysterectomy    NECK SURGERY  07/03/2013    Neck Surgery       Social History     Substance and Sexual Activity   Drug Use Not Currently     Tobacco Use: Low Risk  (6/3/2024)    Patient History     Smoking Tobacco Use: Never     Smokeless Tobacco Use: Never     Passive Exposure: Never     Alcohol Use: Not on file       Current Outpatient Medications   Medication Instructions    atorvastatin (LIPITOR) 80 mg, oral, Nightly    gabapentin (NEURONTIN) 300 mg, oral, Nightly    Gemtesa 75 mg, oral, Daily    Gvoke PFS 2-Pack Syringe 1 mg/0.2 mL syringe INJECT 1 MG SUBCUTANEOUSLY AS NEEDED FOR HYPOGLYCEMIA. IF BLOOD GLUCOSE NOT RESPONDING IN 15 MINUTES  REPEAT WITH ANOTHER DOSE    insulin regular (HumuLIN R U-500) 500 unit/mL CONCENTRATED injection .25 mL brkfst, .25 mL lunch, .10 mL dinner Subcutaneous with meals for 90 days    levothyroxine (SYNTHROID, LEVOXYL) 150 mcg, oral, Daily    levothyroxine (SYNTHROID, LEVOXYL) 125 mcg, oral, Daily    methylphenidate (RITALIN) 20 mg, oral, 2 times daily    [START ON 7/3/2024] methylphenidate (RITALIN) 20 mg, oral, 2 times daily    [START ON 8/2/2024] methylphenidate (RITALIN) 20 mg, oral, 2 times daily    metoprolol tartrate (LOPRESSOR) 25 mg, oral, 2 times daily    sertraline (ZOLOFT) 100 mg, oral, Nightly    solifenacin (VESICARE) 10 mg, oral, Daily    valsartan (DIOVAN) 80 mg, oral, Daily         OBJECTIVE    There were no vitals taken for this visit.      Physical Exam  Neurological Exam      CT HEAD WO IV CONTRAST 01/18/2020    Narrative  MRN: [id number]  Patient Name: CAROLIN VILLAGOMEZ    STUDY:  CT HEAD WO CONTRAST; 1/18/2020 2:58 am    INDICATION:  Fall with head injury.    COMPARISON:  None.    ACCESSION NUMBER(S):  [id number]    ORDERING CLINICIAN:  ZANE YUSUF    TECHNIQUE:  Axial noncontrast CT images of the head.    FINDINGS:  BRAIN  PARENCHYMA: Gray-white matter interfaces are preserved. No mass  effect or midline shift.    HEMORRHAGE: No acute intracranial hemorrhage.  VENTRICLES and EXTRA-AXIAL SPACES: Normal size.  EXTRACRANIAL SOFT TISSUES: Within normal limits.  PARANASAL SINUSES/MASTOIDS: The visualized paranasal sinuses and  mastoid air cells are aerated.  CALVARIUM: No depressed skull fracture. No destructive osseous lesion.    OTHER FINDINGS: None.    Impression  No acute intracranial abnormality.      Lab Results   Component Value Date    WBC 7.1 03/12/2024    RBC 4.80 03/12/2024    HGB 14.0 03/12/2024    HCT 44.7 03/12/2024     03/12/2024     03/12/2024    K 4.5 03/12/2024     03/12/2024    BUN 17 03/12/2024    CREATININE 0.93 03/12/2024    EGFR 68 03/12/2024    CALCIUM 9.9 03/12/2024    ALKPHOS 99 03/12/2024    AST 28 03/12/2024    ALT 27 03/12/2024    VITD25 22 (A) 07/17/2018    HGBA1C 8.7 (H) 03/12/2024    LDLCALC 75 03/12/2024    CHOL 148 03/12/2024    HDL 43.4 03/12/2024    TRIG 150 (H) 03/12/2024    TSH 0.10 (L) 03/12/2024          ASSESSMENT & PLAN  Problem List Items Addressed This Visit       Idiopathic hypersomnia - Primary    Overview     Has prior narcolepsy diagnosis but no supporting MSLT  Sleep attacks controlled on stimulant but still sleepy overall.   No cataplexy.   Failed wakix (side effects) and vyvanse (no help per pt)  S/p adderall 30 mg am and 10 mg early afternoon (changed to ritalin due to shortage)  Currently on methylphenidate 10 mg BID. Still very sleepy.            Relevant Medications    methylphenidate (Ritalin) 20 mg tablet    methylphenidate (Ritalin) 20 mg tablet (Start on 7/3/2024)    methylphenidate (Ritalin) 20 mg tablet (Start on 8/2/2024)    Narcolepsy ([facility])    Relevant Medications    methylphenidate (Ritalin) 20 mg tablet    methylphenidate (Ritalin) 20 mg tablet (Start on 7/3/2024)    methylphenidate (Ritalin) 20 mg tablet (Start on 8/2/2024)    Polyneuropathy in  diabetes (Multi)    Relevant Medications    gabapentin (Neurontin) 300 mg capsule    Restless legs syndrome    Overview     Well controlled on gabapentin 300 mg at night         Relevant Medications    gabapentin (Neurontin) 300 mg capsule    Obstructive sleep apnea    Overview     DME is [facility]  ResMed device  AutoCPAP 6-20 cm H2O  Has 2nd CPAP for traveling          OARRS:  Cadence López, APRN-CNP on 6/3/2024  9:42 AM  I have personally reviewed the OARRS report for Rajni Casiano. I have considered the risks of abuse, dependence, addiction and diversion and I believe that it is clinically appropriate for Rajni Casiano to be prescribed this medication    Is the patient prescribed a combination of a benzodiazepine and opioid?  No    Last Urine Drug Screen / ordered today: No  Recent Results (from the past 8760 hour(s))   Amphetamine Confirm, Urine    Collection Time: 11/02/23 10:25 AM   Result Value Ref Range    Methamphetamine Quant, Ur <200 ng/mL    MDA, Urine <200 ng/mL    MDEA, Urine <200 ng/mL    Phentermine,Urine <200 ng/mL    Amphetamines,Urine 3941 ng/mL    MDMA, Urine <200 ng/mL   Drug Screen, Urine With Reflex to Confirmation    Collection Time: 11/02/23 10:25 AM   Result Value Ref Range    Amphetamine Screen, Urine Presumptive Positive (A) Presumptive Negative    Barbiturate Screen, Urine Presumptive Negative Presumptive Negative    Benzodiazepines Screen, Urine Presumptive Negative Presumptive Negative    Cannabinoid Screen, Urine Presumptive Negative Presumptive Negative    Cocaine Metabolite Screen, Urine Presumptive Negative Presumptive Negative    Fentanyl Screen, Urine Presumptive Negative Presumptive Negative    Opiate Screen, Urine Presumptive Negative Presumptive Negative    Oxycodone Screen, Urine Presumptive Negative Presumptive Negative    PCP Screen, Urine Presumptive Negative Presumptive Negative     Results are as expected.         Controlled Substance Agreement:  Date of the Last Agreement:  11/2/23  Reviewed Controlled Substance Agreement including but not limited to the benefits, risks, and alternatives to treatment with a Controlled Substance medication(s).    Stimulants:   What is the patient's goal of therapy? Improved daytime sleepiness  Is this being achieved with current treatment? yes    Activities of Daily Living:   Is your overall impression that this patient is benefiting (symptom reduction outweighs side effects) from stimulant therapy? Yes     1. Physical Functioning: Better  2. Family Relationship: Better  3. Social Relationship: Better  4. Mood: Better  5. Sleep Patterns: Better  6. Overall Function: Better    FU in 3 months         Cadence López, APRN-CNP

## 2024-06-12 LAB — NONINV COLON CA DNA+OCC BLD SCRN STL QL: NEGATIVE

## 2024-06-14 ENCOUNTER — TELEPHONE (OUTPATIENT)
Dept: PRIMARY CARE | Facility: CLINIC | Age: 64
End: 2024-06-14
Payer: MEDICARE

## 2024-06-14 NOTE — TELEPHONE ENCOUNTER
----- Message from Jose Luis Drew MD sent at 6/12/2024  7:24 AM EDT -----  Cologuard screen is negative, plan to repeat Cologuard or Colonoscopy in 3 years    ----- Message -----  From: Zoya Griggs Results In  Sent: 6/12/2024   2:18 AM EDT  To: Zeny Lindsey Pla, DO

## 2024-07-01 ENCOUNTER — OFFICE VISIT (OUTPATIENT)
Dept: OBSTETRICS AND GYNECOLOGY | Facility: CLINIC | Age: 64
End: 2024-07-01
Payer: MEDICARE

## 2024-07-01 VITALS
SYSTOLIC BLOOD PRESSURE: 133 MMHG | HEIGHT: 62 IN | DIASTOLIC BLOOD PRESSURE: 72 MMHG | BODY MASS INDEX: 40.85 KG/M2 | HEART RATE: 67 BPM | WEIGHT: 222 LBS

## 2024-07-01 DIAGNOSIS — N32.81 OVERACTIVE BLADDER: Primary | ICD-10-CM

## 2024-07-01 PROCEDURE — 3078F DIAST BP <80 MM HG: CPT | Performed by: OBSTETRICS & GYNECOLOGY

## 2024-07-01 PROCEDURE — 3075F SYST BP GE 130 - 139MM HG: CPT | Performed by: OBSTETRICS & GYNECOLOGY

## 2024-07-01 PROCEDURE — 1157F ADVNC CARE PLAN IN RCRD: CPT | Performed by: OBSTETRICS & GYNECOLOGY

## 2024-07-01 PROCEDURE — 3048F LDL-C <100 MG/DL: CPT | Performed by: OBSTETRICS & GYNECOLOGY

## 2024-07-01 PROCEDURE — 4010F ACE/ARB THERAPY RXD/TAKEN: CPT | Performed by: OBSTETRICS & GYNECOLOGY

## 2024-07-01 PROCEDURE — 1159F MED LIST DOCD IN RCRD: CPT | Performed by: OBSTETRICS & GYNECOLOGY

## 2024-07-01 PROCEDURE — 1126F AMNT PAIN NOTED NONE PRSNT: CPT | Performed by: OBSTETRICS & GYNECOLOGY

## 2024-07-01 PROCEDURE — 99213 OFFICE O/P EST LOW 20 MIN: CPT | Performed by: OBSTETRICS & GYNECOLOGY

## 2024-07-01 PROCEDURE — 1123F ACP DISCUSS/DSCN MKR DOCD: CPT | Performed by: OBSTETRICS & GYNECOLOGY

## 2024-07-01 PROCEDURE — 3052F HG A1C>EQUAL 8.0%<EQUAL 9.0%: CPT | Performed by: OBSTETRICS & GYNECOLOGY

## 2024-07-01 RX ORDER — VIBEGRON 75 MG/1
1 TABLET, FILM COATED ORAL DAILY
Qty: 30 TABLET | Refills: 3 | Status: SHIPPED | OUTPATIENT
Start: 2024-07-01

## 2024-07-01 RX ORDER — SOLIFENACIN SUCCINATE 10 MG/1
10 TABLET, FILM COATED ORAL DAILY
Qty: 30 TABLET | Refills: 3 | Status: SHIPPED | OUTPATIENT
Start: 2024-07-01

## 2024-07-01 ASSESSMENT — ENCOUNTER SYMPTOMS
CONSTITUTIONAL NEGATIVE: 1
CARDIOVASCULAR NEGATIVE: 1
MUSCULOSKELETAL NEGATIVE: 1
RESPIRATORY NEGATIVE: 1
GASTROINTESTINAL NEGATIVE: 1
ENDOCRINE NEGATIVE: 1
NEUROLOGICAL NEGATIVE: 1
EYES NEGATIVE: 1

## 2024-07-01 ASSESSMENT — PAIN SCALES - GENERAL: PAINLEVEL: 0-NO PAIN

## 2024-07-01 NOTE — PROGRESS NOTES
Urogynecology  Provider:  Silvia Rosa MD  [phone]              ASSESSMENT AND PLAN:   63-year-old female being assessed for OAB. Comorbidities include: essential HT, CABG, hypothyroidism, diabetes, HLD, and GODWIN.    Diagnoses:  #1 OAB   #2 Emotional stress and grief    Plan:  OAB  - Continue with Solifenacin at an increased dose (10 mg). Rx sent to the patient's preferred pharmacy.  - Sent Rx of Gemtesa 75mg to her preferred pharmacy with instructions to take 1 pill by mouth daily. We discussed this drug profile being a beta agonist and that this medication virtually has no side effects. We advised her to call our office if Gemtesa is cost-prohibitive so we can send an alternative Rx of Trospium or Myrbetriq to her pharmacy.  - Consider future options such as bladder pacemaker or new ankle device for nerve stimulation.    2. Emotional stress and grief  - Acknowledged the emotional burden; encouraged grieving.  - Provided emotional support and validated her feelings.    She will follow-up in 2 months to assess the effectiveness of the new medication regimen with Dr. Rosa.    Scribe Attestation  By signing my name below, IJovani Scribe, attest that this documentation has been prepared under the direction and in the presence of Silvia Rosa MD on 07/01/2024 at 4:57 PM.    Agree with above. I Dr. Rosa, personally performed the services described in the documentation which was scribed virtually and confirm it is both complete and accurate.  Silvia Rosa MD        Problem List Items Addressed This Visit    None  Visit Diagnoses       Overactive bladder    -  Primary    Relevant Medications    solifenacin (VESIcare) 10 mg tablet    vibegron (Gemtesa) 75 mg tablet    Other Relevant Orders    Measure post void residual    POCT UA Automated manually resulted                I spent a total of eConsult Time: 20 minutes in face to face and non face to face time.        Silvia Rosa  MD        HISTORY OF PRESENT ILLNESS:   63-year-old female being assessed for OAB.     Last visit 4/2024  Not better with last Botox injection  Solifenacin not helping.  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  - She has been taking care of her grandchildren frequently, as their mother, Jennifer, often calls mid-week to have them picked up.  - Mentioned that Jennifer might have had a positive drug test, but no actions have been taken yet.  - She also recently lost her mother, who had been in the hospital for 2 months before passing away.  - The patient's mother was diagnosed with acute encephalitis, and only 20% of her brain was functioning at her time of death.  - She has been dealing with the emotional and physical toll of cleaning out her mother's apartment and managing her affairs.  - Patient also lost a long-time  friend recently, adding to her emotional burden.      Urinary Symptoms:   - She has been experiencing urinary incontinence.  - She has been on Solifenacin, which she feels worked better than her current medication, Gemtesa.  - She is willing to try a combination of medications to manage her sx better.         Past Medical History:      Past Medical History:   Diagnosis Date    Narcolepsy without cataplexy ([facility])     Narcolepsy    Obstructive sleep apnea (adult) (pediatric)     Obstructive sleep apnea    Personal history of other diseases of the circulatory system     History of hypertension    Personal history of other endocrine, nutritional and metabolic disease     History of hypercholesterolemia    Personal history of other endocrine, nutritional and metabolic disease     History of hypothyroidism    Personal history of other endocrine, nutritional and metabolic disease     History of diabetes mellitus          Past Surgical History:       Past Surgical History:   Procedure  Laterality Date    CATARACT EXTRACTION  07/03/2013    Cataract Surgery    CORONARY ARTERY BYPASS GRAFT  01/12/2017    CABG    HYSTERECTOMY  07/03/2013    Hysterectomy    NECK SURGERY  07/03/2013    Neck Surgery         Medications:       Prior to Admission medications    Medication Sig Start Date End Date Taking? Authorizing Provider   atorvastatin (Lipitor) 80 mg tablet Take 1 tablet (80 mg) by mouth once daily at bedtime. 3/6/24   Zeny Lindsey Hong, DO   gabapentin (Neurontin) 300 mg capsule Take 1 capsule (300 mg) by mouth once daily at bedtime. 6/3/24 6/3/25  BRYANT Rodriguez   Gvoke PFS 2-Pack Syringe 1 mg/0.2 mL syringe INJECT 1 MG SUBCUTANEOUSLY AS NEEDED FOR HYPOGLYCEMIA. IF BLOOD GLUCOSE NOT RESPONDING IN 15 MINUTES  REPEAT WITH ANOTHER DOSE 6/27/23   Historical Provider, MD   insulin regular (HumuLIN R U-500) 500 unit/mL CONCENTRATED injection .25 mL brkfst, .25 mL lunch, .10 mL dinner Subcutaneous with meals for 90 days    Historical Provider, MD   levothyroxine (Synthroid, Levoxyl) 125 mcg tablet Take 1 tablet (125 mcg) by mouth once daily. 3/13/24 3/13/25  Zeny Lindsey Hong, DO   levothyroxine (Synthroid, Levoxyl) 150 mcg tablet Take 1 tablet (150 mcg) by mouth once daily. 3/6/24 6/4/24  Zeny Lindsey Hong, DO   methylphenidate (Ritalin) 20 mg tablet Take 1 tablet (20 mg) by mouth 2 times a day. 6/3/24 7/3/24  BRYANT Rodriguez   methylphenidate (Ritalin) 20 mg tablet Take 1 tablet (20 mg) by mouth 2 times a day. Do not fill before July 3, 2024. 7/3/24 8/2/24  BRYANT Rodriguez   methylphenidate (Ritalin) 20 mg tablet Take 1 tablet (20 mg) by mouth 2 times a day. Do not fill before August 2, 2024. 8/2/24 9/1/24  BRYANT Rodriguez   metoprolol tartrate (Lopressor) 25 mg tablet Take 1 tablet (25 mg) by mouth 2 times a day. 11/16/23 11/10/24  Joycelyn L Celina, APRN-CNP   sertraline (Zoloft) 100 mg tablet Take 1 tablet (100 mg) by mouth once daily at bedtime. 3/6/24  "9/2/24  Zeny Lindsey Hong, DO   solifenacin (VESIcare) 10 mg tablet TAKE ONE TABLET BY MOUTH EVERY DAY 4/16/24   JASON Garcia-CNP   valsartan (Diovan) 80 mg tablet Take 1 tablet (80 mg) by mouth once daily. 3/6/24 3/6/25  Zeny Lindsey Hong, DO   vibegron (Gemtesa) 75 mg tablet Take 1 tablet (75 mg) by mouth early in the morning.. 4/22/24   Silvia Rosa MD        ROS  Review of Systems   Constitutional: Negative.    HENT: Negative.     Eyes: Negative.    Respiratory: Negative.     Cardiovascular: Negative.    Gastrointestinal: Negative.    Endocrine: Negative.    Genitourinary:         Urinary incontinence, previously managed better with solifenacin   Musculoskeletal: Negative.    Neurological: Negative.    Psychiatric/Behavioral:          Experiencing grief and emotional distress        POC Blood, Urine   Date Value Ref Range Status   12/11/2023 TRACE-Lysed (A) NEGATIVE Final     Poc Nitrite, Urine   Date Value Ref Range Status   12/11/2023 NEGATIVE NEGATIVE Final     POC Urobilinogen, Urine   Date Value Ref Range Status   12/11/2023 4.0 (A) 0.2, 1.0 EU/DL Final     POC Leukocytes, Urine   Date Value Ref Range Status   12/11/2023 SMALL (1+) (A) NEGATIVE Final         PHYSICAL EXAM:      /72   Pulse 67   Ht 1.575 m (5' 2\")   Wt 101 kg (222 lb)   BMI 40.60 kg/m²      No LMP recorded. Patient has had a hysterectomy.      Declines chaperone for physical exam.      Well developed, well nourished, in no apparent distress.   Neurologic/Psychiatric:  Awake, Alert and Oriented times 3.  Affect normal.         Data and DIAGNOSTIC STUDIES REVIEWED   No results found.   Lab Results   Component Value Date    URINECULTURE NO GROWTH 08/28/2023      Lab Results   Component Value Date    GLUCOSE 213 (H) 03/12/2024    CALCIUM 9.9 03/12/2024     03/12/2024    K 4.5 03/12/2024    CO2 29 03/12/2024     03/12/2024    BUN 17 03/12/2024    CREATININE 0.93 03/12/2024     Lab Results   Component " Value Date    WBC 7.1 03/12/2024    HGB 14.0 03/12/2024    HCT 44.7 03/12/2024    MCV 93 03/12/2024     03/12/2024          Jovani Pena

## 2024-07-16 ENCOUNTER — APPOINTMENT (OUTPATIENT)
Dept: OPHTHALMOLOGY | Facility: CLINIC | Age: 64
End: 2024-07-16
Payer: MEDICARE

## 2024-07-16 DIAGNOSIS — Z96.1 PSEUDOPHAKIA: ICD-10-CM

## 2024-07-16 DIAGNOSIS — E11.3299 NONPROLIFERATIVE DIABETIC RETINOPATHY (MULTI): Primary | ICD-10-CM

## 2024-07-16 DIAGNOSIS — H43.813 POSTERIOR VITREOUS DETACHMENT OF BOTH EYES: ICD-10-CM

## 2024-07-16 DIAGNOSIS — E11.3293 TYPE 2 DIABETES MELLITUS WITH BOTH EYES AFFECTED BY MILD NONPROLIFERATIVE RETINOPATHY WITHOUT MACULAR EDEMA, WITHOUT LONG-TERM CURRENT USE OF INSULIN (MULTI): ICD-10-CM

## 2024-07-16 PROCEDURE — 99213 OFFICE O/P EST LOW 20 MIN: CPT | Performed by: OPHTHALMOLOGY

## 2024-07-16 PROCEDURE — 92134 CPTRZ OPH DX IMG PST SGM RTA: CPT | Performed by: OPHTHALMOLOGY

## 2024-07-16 ASSESSMENT — REFRACTION_MANIFEST
OD_AXIS: 075
OD_ADD: +2.50
OS_AXIS: 065
OS_ADD: +2.50
OD_CYLINDER: -1.00
OD_SPHERE: -0.75
OS_SPHERE: -0.50
OS_CYLINDER: -0.50

## 2024-07-16 ASSESSMENT — ENCOUNTER SYMPTOMS
ENDOCRINE NEGATIVE: 1
HEMATOLOGIC/LYMPHATIC NEGATIVE: 0
GASTROINTESTINAL NEGATIVE: 0
EYES NEGATIVE: 1
CONSTITUTIONAL NEGATIVE: 0
MUSCULOSKELETAL NEGATIVE: 0
ALLERGIC/IMMUNOLOGIC NEGATIVE: 0
PSYCHIATRIC NEGATIVE: 0
CARDIOVASCULAR NEGATIVE: 0
RESPIRATORY NEGATIVE: 0
NEUROLOGICAL NEGATIVE: 0

## 2024-07-16 ASSESSMENT — REFRACTION_WEARINGRX
OD_SPHERE: -0.75
OS_CYLINDER: -0.50
OS_AXIS: 065
OS_ADD: +2.25
OD_CYLINDER: -1.00
OD_AXIS: 075
OD_ADD: +2.25
OS_SPHERE: -0.50

## 2024-07-16 ASSESSMENT — CONF VISUAL FIELD
OD_SUPERIOR_TEMPORAL_RESTRICTION: 0
OD_INFERIOR_NASAL_RESTRICTION: 0
OD_INFERIOR_TEMPORAL_RESTRICTION: 0
OD_SUPERIOR_NASAL_RESTRICTION: 0
OS_NORMAL: 1
OS_SUPERIOR_TEMPORAL_RESTRICTION: 0
OS_INFERIOR_NASAL_RESTRICTION: 0
OD_NORMAL: 1
OS_SUPERIOR_NASAL_RESTRICTION: 0
OS_INFERIOR_TEMPORAL_RESTRICTION: 0

## 2024-07-16 ASSESSMENT — TONOMETRY
IOP_METHOD: GOLDMANN APPLANATION
OD_IOP_MMHG: 15
OS_IOP_MMHG: 14

## 2024-07-16 ASSESSMENT — CUP TO DISC RATIO
OD_RATIO: .3
OS_RATIO: .3

## 2024-07-16 ASSESSMENT — EXTERNAL EXAM - LEFT EYE: OS_EXAM: NORMAL

## 2024-07-16 ASSESSMENT — EXTERNAL EXAM - RIGHT EYE: OD_EXAM: NORMAL

## 2024-07-16 ASSESSMENT — SLIT LAMP EXAM - LIDS
COMMENTS: GOOD POSITION
COMMENTS: GOOD POSITION

## 2024-07-16 ASSESSMENT — VISUAL ACUITY
OS_BAT_MED: 20/20
OD_CC: 20/25
OS_CC: 20/20
METHOD: SNELLEN - LINEAR
CORRECTION_TYPE: GLASSES
OD_BAT_MED: 20/25

## 2024-07-16 NOTE — PROGRESS NOTES
Annual visit     1. DM 2 with NPDR OU   h/o DM for over 30 years, a1c keeps fluctuating , difficult to control BG due to insulin resistance, rony 2016, lost insurance, now on [facility]   a1c better at 8.7 from 10  On insulin now  On exam: both eyes with mid peripheral and inf heme   OCT Macula:   OU: normal foveal contour, no IRF/SRF   stable exam, fu 1 year     2.  PVD OU  stable, monitor     3. Pseudophakia OU   had toric lenses placed, opted for monovision with OD of reading   toric axis at 20   donig well.     4 Myopia  uses glasses for distance, and cheaters for up close. now ahs more difficulty with reading.   New mrx given

## 2024-07-22 PROBLEM — M54.50 LOW BACK PAIN: Status: ACTIVE | Noted: 2024-07-22

## 2024-07-22 PROBLEM — M17.12 ARTHRITIS OF LEFT KNEE: Status: ACTIVE | Noted: 2024-07-22

## 2024-07-22 PROBLEM — Z86.39 HISTORY OF HYPERCHOLESTEROLEMIA: Status: ACTIVE | Noted: 2024-07-22

## 2024-07-22 PROBLEM — M54.16 LUMBAR RADICULOPATHY: Status: ACTIVE | Noted: 2024-07-22

## 2024-07-22 PROBLEM — B02.9 SHINGLES: Status: ACTIVE | Noted: 2024-07-22

## 2024-07-22 PROBLEM — L03.019 PARONYCHIA OF FINGER: Status: ACTIVE | Noted: 2024-07-22

## 2024-07-22 PROBLEM — R22.31 NODULE OF SKIN OF RIGHT HAND: Status: ACTIVE | Noted: 2024-07-22

## 2024-07-22 PROBLEM — N39.0 UTI (URINARY TRACT INFECTION): Status: ACTIVE | Noted: 2024-07-22

## 2024-07-22 PROBLEM — B37.31 VAGINA, CANDIDIASIS: Status: ACTIVE | Noted: 2024-07-22

## 2024-07-22 PROBLEM — R21 RASH: Status: ACTIVE | Noted: 2024-07-22

## 2024-07-22 PROBLEM — M25.511 RIGHT SHOULDER PAIN: Status: ACTIVE | Noted: 2024-07-22

## 2024-07-22 PROBLEM — H52.10 MYOPIA: Status: ACTIVE | Noted: 2024-07-22

## 2024-07-22 PROBLEM — N90.89 LABIAL IRRITATION: Status: ACTIVE | Noted: 2024-07-22

## 2024-07-22 PROBLEM — E11.610 CHARCOT FOOT DUE TO DIABETES MELLITUS (MULTI): Status: ACTIVE | Noted: 2020-05-05

## 2024-07-22 PROBLEM — N30.00 ACUTE CYSTITIS: Status: ACTIVE | Noted: 2024-07-22

## 2024-07-22 PROBLEM — Z86.39 HISTORY OF DIABETES MELLITUS: Status: ACTIVE | Noted: 2024-07-22

## 2024-07-22 PROBLEM — J34.89 SINUS PRESSURE: Status: ACTIVE | Noted: 2024-07-22

## 2024-07-22 PROBLEM — R05.9 COUGH: Status: ACTIVE | Noted: 2024-07-22

## 2024-07-22 PROBLEM — R53.83 FATIGUE: Status: ACTIVE | Noted: 2024-07-22

## 2024-07-22 PROBLEM — J01.90 ACUTE SINUSITIS: Status: ACTIVE | Noted: 2024-07-22

## 2024-07-22 PROBLEM — M79.642 PAIN OF LEFT HAND: Status: ACTIVE | Noted: 2024-07-22

## 2024-07-22 PROBLEM — J06.9 ACUTE URI: Status: ACTIVE | Noted: 2024-07-22

## 2024-07-22 PROBLEM — E11.65 TYPE 2 DIABETES MELLITUS WITH HYPERGLYCEMIA (MULTI): Status: ACTIVE | Noted: 2023-03-08

## 2024-07-22 PROBLEM — B37.49 CANDIDIASIS, UROGENITAL: Status: ACTIVE | Noted: 2024-07-22
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
## 2024-07-22 PROBLEM — Z86.39 HISTORY OF HYPOTHYROIDISM: Status: ACTIVE | Noted: 2024-07-22

## 2024-07-22 PROBLEM — R22.31: Status: ACTIVE | Noted: 2024-07-22

## 2024-07-22 PROBLEM — R32 URINARY INCONTINENCE: Status: ACTIVE | Noted: 2024-07-22

## 2024-07-22 PROBLEM — Z86.79 HISTORY OF HYPERTENSION: Status: ACTIVE | Noted: 2024-07-22

## 2024-07-22 PROBLEM — S69.92XA LEFT WRIST INJURY: Status: ACTIVE | Noted: 2024-07-22

## 2024-07-22 RX ORDER — GLUCAGON INJECTION, SOLUTION 1 MG/.2ML
INJECTION, SOLUTION SUBCUTANEOUS
COMMUNITY
Start: 2024-02-27

## 2024-07-22 RX ORDER — TIRZEPATIDE 7.5 MG/.5ML
INJECTION, SOLUTION SUBCUTANEOUS
COMMUNITY
Start: 2024-07-02

## 2024-07-22 RX ORDER — PEN NEEDLE, DIABETIC 29 G X1/2"
NEEDLE, DISPOSABLE MISCELLANEOUS
COMMUNITY
Start: 2024-06-09

## 2024-07-22 RX ORDER — MECLIZINE HCL 12.5 MG 12.5 MG/1
TABLET ORAL
COMMUNITY

## 2024-09-04 ENCOUNTER — APPOINTMENT (OUTPATIENT)
Dept: PRIMARY CARE | Facility: CLINIC | Age: 64
End: 2024-09-04
Payer: MEDICARE

## 2024-09-10 ENCOUNTER — APPOINTMENT (OUTPATIENT)
Dept: CARDIOLOGY | Facility: HOSPITAL | Age: 64
End: 2024-09-10
Payer: MEDICARE

## 2024-09-10 NOTE — PROGRESS NOTES
"Counseling:  The patient was counseled regarding diagnostic results, instructions for management, risk factor reductions, prognosis, patient and family education, impressions, risks and benefits of treatment options and importance of compliance with treatment.      Chief Complaint:   The patient presents today for annual followup of CAD and hyperlipidemia.       History Of Present Illness:    Rajni Casiano is a 63 year old female patient who presents today for overdue annual followup of CAD and hyperlipidemia. Her PMH is significant for CAD s/p CABG Ã--3 11/2016, DM, hyperlipidemia, HTN, hypothyroidism, idiopathic hypersomnia, morbid obesity and GODWIN (on CPAP).      Last Recorded Vitals:  There were no vitals filed for this visit.    Past Surgical History:  She has a past surgical history that includes Hysterectomy (07/03/2013); Cataract extraction (07/03/2013); Neck surgery (07/03/2013); and Coronary artery bypass graft (01/12/2017).      Social History:  She reports that she has never smoked. She has never been exposed to tobacco smoke. She has never used smokeless tobacco. She reports that she does not currently use alcohol. She reports that she does not currently use drugs.    Family History:  Family History   Problem Relation Name Age of Onset    Breast cancer Mother  80        Allergies:  Clarithromycin and Modafinil    Outpatient Medications:  Current Outpatient Medications   Medication Instructions    atorvastatin (LIPITOR) 80 mg, oral, Nightly    BD Insulin Syringe Ultra-Fine 0.5 mL 31 gauge x 5/16\" syringe USE THREE TIMES A DAY    gabapentin (NEURONTIN) 300 mg, oral, Nightly    Gemtesa 75 mg, oral, Daily    Gvoke HypoPen 2-Pack 1 mg/0.2 mL auto-injector USE AS DIRECTED FOR HYPOGLYCEMIA  REPEAT IN 15 MINUTES IF NEEDED    insulin regular (HumuLIN R U-500) 500 unit/mL CONCENTRATED injection .25 mL brkfst, .25 mL lunch, .10 mL dinner Subcutaneous with meals for 90 days    levothyroxine (SYNTHROID, LEVOXYL) 150 " mcg, oral, Daily    levothyroxine (SYNTHROID, LEVOXYL) 125 mcg, oral, Daily    meclizine (Antivert) 12.5 mg tablet oral    methylphenidate (RITALIN) 20 mg, oral, 2 times daily    methylphenidate (RITALIN) 20 mg, oral, 2 times daily    methylphenidate (RITALIN) 20 mg, oral, 2 times daily    metoprolol tartrate (LOPRESSOR) 25 mg, oral, 2 times daily    Mounjaro 7.5 mg/0.5 mL pen injector     sertraline (ZOLOFT) 100 mg, oral, Nightly    solifenacin (VESICARE) 10 mg, oral, Daily    valsartan (DIOVAN) 80 mg, oral, Daily     Review of Systems   All other systems reviewed and are negative.     Physical Exam:  Constitutional:       Appearance: Healthy appearance. Not in distress.   Neck:      Vascular: No JVR. JVD normal.   Pulmonary:      Effort: Pulmonary effort is normal.      Breath sounds: Normal breath sounds. No wheezing. No rhonchi. No rales.   Chest:      Chest wall: Not tender to palpatation.   Cardiovascular:      PMI at left midclavicular line. Normal rate. Regular rhythm. Normal S1. Normal S2.       Murmurs: There is no murmur.      No gallop.  No click. No rub.   Pulses:     Intact distal pulses.   Edema:     Peripheral edema absent.   Abdominal:      General: Bowel sounds are normal.      Palpations: Abdomen is soft.      Tenderness: There is no abdominal tenderness.   Musculoskeletal: Normal range of motion.         General: No tenderness. Skin:     General: Skin is warm and dry.   Neurological:      General: No focal deficit present.      Mental Status: Alert and oriented to person, place and time.          Last Labs:  CBC -  Lab Results   Component Value Date    WBC 7.1 03/12/2024    HGB 14.0 03/12/2024    HCT 44.7 03/12/2024    MCV 93 03/12/2024     03/12/2024       CMP -  Lab Results   Component Value Date    CALCIUM 9.9 03/12/2024    PROT 7.2 03/12/2024    ALBUMIN 4.0 03/12/2024    AST 28 03/12/2024    ALT 27 03/12/2024    ALKPHOS 99 03/12/2024    BILITOT 0.6 03/12/2024       LIPID PANEL -   Lab  Results   Component Value Date    CHOL 148 03/12/2024    TRIG 150 (H) 03/12/2024    HDL 43.4 03/12/2024    CHHDL 3.4 03/12/2024    LDLF 69 01/18/2023    VLDL 30 03/12/2024    NHDL 105 03/12/2024       RENAL FUNCTION PANEL -   Lab Results   Component Value Date    GLUCOSE 213 (H) 03/12/2024     03/12/2024    K 4.5 03/12/2024     03/12/2024    CO2 29 03/12/2024    ANIONGAP 16 03/12/2024    BUN 17 03/12/2024    CREATININE 0.93 03/12/2024    CALCIUM 9.9 03/12/2024    ALBUMIN 4.0 03/12/2024        Lab Results   Component Value Date    BNP 73 07/10/2018    HGBA1C 8.7 (H) 03/12/2024       Last Cardiology Tests:  07/18/2022 - Stress Test  1. No clinical or electrocardiographic evidence for ischemia at maximal infusion.  2. Normal stress myocardial perfusion imaging in response to pharmacologic stress. Well-maintained left ventricular function.      04/20/2021 - TTE  1. The left ventricular systolic function is normal with a 55-60% estimated ejection fraction.  2. There is mild asymmetric left ventricular hypertrophy.     11/01/2018 - Cardiac Catheterization (RH)  Normal right heart pressures.      10/11/2018 - Echocardiogram  1. This was a very technically difficult study with very suboptimal imaging. Limited diagnostic accuracy is to be expected. Clinical correlation is required. Additional imaging modalities may need to be considered.   2. Normal left ventricular size and regional systolic function with an ejection fraction of 55%.     10/11/2018 - CT Chest  1. There is no CT evidence of interstitial lung disease.  2. There is a 2 mm nodule in the right middle lobe. According to latest Fleischner criteria-SOLID NODULE <6MM- No follow-up imaging is recommended. If there are risk factors for lung malignancy, a follow-up chest CT exam could be obtained in 12 months.  3. No thoracic lymphadenopathy.  4. Cardiovascular findings as discussed in the body of the report.  5. Nonspecific subcutaneous fat stranding is  seen in the anterior abdominal wall bilaterally is only partially included on the field of view. Clinical correlation is recommended.      12/08/2017 - Nuclear Stress Test  1. Normal stress myocardial perfusion imaging in response to pharmacologic stress.  2. Well-maintained left ventricular function.      12/08/2017 - Regadenoson Myocardial Perfusion Stress Test  Normal regadenoson stress test. MPI to follow.     11/21/2017 - Echocardiogram  1. Normal left ventricular size and regional systolic function with an ejection fraction of 65%.  2. Normal right ventricular systolic function.  3. Trace mitral regurgitation.  4. Trace tricuspid regurgitation.    Lab review: I have personally reviewed the laboratory result(s).    Assessment/Plan   1) CAD S/P CABG Nov. 2016  On aspirin 81 mg daily and metoprolol tartrate 25 mg BID  TTE 04/20/2021 with LVEF 55-60%, mild asymmetric LVH   Stress 07/18/2022 negative for ischemia      2) SOB, Chronic Nonproductive Cough  PFTs suggestive of Interstitial Lung disease  CT chest Oct. 2018 shows no ILD  Right heart cath 2018 with normal right heart hemodynamics  Negative stress test 07/2022   Increase physical activity      3) Hyperlipidemia  Goal LDL <70  Counseled regarding diet and exercise  Lipid panel 03/12/2024 with LDL of 75; near goal     4) DM  Management per endocrinology         Scribe Attestation  By signing my name below, I, Brain Mai   attest that this documentation has been prepared under the direction and in the presence of Sergio Bansal MD.

## 2024-09-15 NOTE — PROGRESS NOTES
Urogynecology  Provider:  Silvia Rosa MD  [phone]      ASSESSMENT AND PLAN:   64 year old female with an acute lower UTI and OAB. Comorbidities include: CAD s/p CABG, HTN, type 2 diabetes, hypothyroidism, narcolepsy, and GODWIN.     Diagnoses:  #1 Overactive bladder  #2 Acute lower UTI    Plan:  1. OAB  - PVR = 0mL by bladder U/S.  - She continues taking Gemtesa 75mg and Solifenacin 10mg combination daily to help manage her OAB with only mild improvement and still having significant breakthrough incontinence episodes.     She tried intradetrusor Botox in the past without success.    She had previously tried myrbetriq, solifenacin and trospium without significant symptom improvement.    Given her less than significant improvement and only moderate improvement with the combination therapy of Solifenacin and Gemtesa, we reviewed third-line treatment options. She has previously tried Botox without successful improvement in her symptoms. We discussed the options of PTNS, the Revi implant (REVI), and a PNE with InterStim placement. She would like to try Interstim starting with a PNE.    > Patient would like to proceed with PNE trial after her custody trial in which she will receive guardianship over her grandchildren in early November 2024.     - She will need PAT and does not need PCP clearance.   - Planning for PNE trial with a staged InterStim placement procedure with Dr. Rosa.    > Henna and Lanny will coordinate this with the patient after her grandchildren custody hearing.   - Refilled Rx of Solifenacin 10mg and Gemtesa 75mg to take daily in the interim while awaiting SNM.     2. Acute lower UTI  - POCT UA with +nitrites today.   - We will send her urine for culture given the presence of bacteria/nitrites.   - Start nitrofurantoin today. Rx sent.    All questions were answered.     Follow up in November 2024 for a PNE trial with Dr. Rosa.     Scribe Attestation  By signing my name below, Jaden LOPEZ  Brain Fung, attest that this documentation has been prepared under the direction and in the presence of Silvia Rosa MD on 09/16/2024 at 8:12 PM.     Agree with above. I Dr. Rosa, personally performed the services described in the documentation which was scribed virtually and confirm it is both complete and accurate.  Silvia Rosa MD        Problem List Items Addressed This Visit    None          I spent a total of eConsult Time: 30 minutes in face to face and non face to face time.        Silvia Rosa MD        HISTORY OF PRESENT ILLNESS:   64 year old female following up on OAB.    Record Review:  - Last visit 7/2024  Diagnoses:  #1 OAB   #2 Emotional stress and grief     Plan:  OAB  - Continue with Solifenacin at an increased dose (10 mg). Rx sent to the patient's preferred pharmacy.  - Sent Rx of Gemtesa 75mg to her preferred pharmacy with instructions to take 1 pill by mouth daily. We discussed this drug profile being a beta agonist and that this medication virtually has no side effects. We advised her to call our office if Gemtesa is cost-prohibitive so we can send an alternative Rx of Trospium or Myrbetriq to her pharmacy.  - Consider future options such as bladder pacemaker or new ankle device for nerve stimulation.     2. Emotional stress and grief  - Acknowledged the emotional burden; encouraged grieving.  - Provided emotional support and validated her feelings.     She will follow-up in 2 months to assess the effectiveness of the new medication regimen with Dr. Rosa.      Urinary Symptoms:   - She continues taking Gemtesa 75mg and Solifenacin 10mg daily to help manage her OAB with only mild improvement in her symptoms but feels she could have more symptomatic relief. Patient continues to have breakthrough leakage episodes.   - Nancy is likely to be named the permanent  of her great-grandchildren in November. Although this situation is sad, it is considered better  "for both her and the children overall.   - Given her less than significant improvement and only moderate improvement with the combination therapy of Solifenacin and Gemtesa, we reviewed third-line treatment options. She has previously tried Botox without successful improvement in her symptoms. We discussed the options of PTNS, the Revi implant (REVI), and a PNE with InterStim placement. After discussing the pros and cons of each option, Nancy is amenable to trying a PNE with a staged InterStim placement. She plans to go back to court in November for her great-grandchildren's custody and will likely wait until after that date to schedule her PNE.        Past Medical History:    Past Medical History:   Diagnosis Date    Narcolepsy without cataplexy ([facility])     Narcolepsy    Obstructive sleep apnea (adult) (pediatric)     Obstructive sleep apnea    Personal history of other diseases of the circulatory system     History of hypertension    Personal history of other endocrine, nutritional and metabolic disease     History of hypercholesterolemia    Personal history of other endocrine, nutritional and metabolic disease     History of hypothyroidism    Personal history of other endocrine, nutritional and metabolic disease     History of diabetes mellitus       Past Surgical History:     Past Surgical History:   Procedure Laterality Date    CATARACT EXTRACTION  07/03/2013    Cataract Surgery    CORONARY ARTERY BYPASS GRAFT  01/12/2017    CABG    HYSTERECTOMY  07/03/2013    Hysterectomy    NECK SURGERY  07/03/2013    Neck Surgery       Medications:     Prior to Admission medications    Medication Sig Start Date End Date Taking? Authorizing Provider   atorvastatin (Lipitor) 80 mg tablet Take 1 tablet (80 mg) by mouth once daily at bedtime. 3/6/24   Zeny Lindsey Pla, DO   BD Insulin Syringe Ultra-Fine 0.5 mL 31 gauge x 5/16\" syringe USE THREE TIMES A DAY 6/9/24   Historical Provider, MD   gabapentin (Neurontin) 300 mg " capsule Take 1 capsule (300 mg) by mouth once daily at bedtime. 6/3/24 6/3/25  BRYANT Rodriguez   Gvoke HypoPen 2-Pack 1 mg/0.2 mL auto-injector USE AS DIRECTED FOR HYPOGLYCEMIA  REPEAT IN 15 MINUTES IF NEEDED 2/27/24   Historical Provider, MD   insulin regular (HumuLIN R U-500) 500 unit/mL CONCENTRATED injection .25 mL brkfst, .25 mL lunch, .10 mL dinner Subcutaneous with meals for 90 days    Historical Provider, MD   levothyroxine (Synthroid, Levoxyl) 125 mcg tablet Take 1 tablet (125 mcg) by mouth once daily. 3/13/24 3/13/25  Zeny Lindsey Pla, DO   levothyroxine (Synthroid, Levoxyl) 150 mcg tablet Take 1 tablet (150 mcg) by mouth once daily. 3/6/24 6/4/24  Zeny Lindsey Pla, DO   meclizine (Antivert) 12.5 mg tablet Take by mouth.    Historical Provider, MD   methylphenidate (Ritalin) 20 mg tablet Take 1 tablet (20 mg) by mouth 2 times a day. 6/3/24 7/3/24  BRYANT Rodriguez   methylphenidate (Ritalin) 20 mg tablet Take 1 tablet (20 mg) by mouth 2 times a day. Do not fill before July 3, 2024. 7/3/24 8/2/24  BRYANT Rodriguez   methylphenidate (Ritalin) 20 mg tablet Take 1 tablet (20 mg) by mouth 2 times a day. Do not fill before August 2, 2024. 8/2/24 9/1/24  BRYANT Rodriguez   metoprolol tartrate (Lopressor) 25 mg tablet Take 1 tablet (25 mg) by mouth 2 times a day. 11/16/23 11/10/24  BRYANT Bah   Mounjaro 7.5 mg/0.5 mL pen injector  7/2/24   Historical Provider, MD   sertraline (Zoloft) 100 mg tablet Take 1 tablet (100 mg) by mouth once daily at bedtime. 3/6/24 9/2/24  Zeny Lindsey Pla, DO   solifenacin (VESIcare) 10 mg tablet Take 1 tablet (10 mg) by mouth once daily. 7/1/24   Silvia Rosa MD   valsartan (Diovan) 80 mg tablet Take 1 tablet (80 mg) by mouth once daily. 3/6/24 3/6/25  Zeny Lindsey Pla, DO   vibegron (Gemtesa) 75 mg tablet Take 1 tablet (75 mg) by mouth early in the morning.. 7/1/24   Silvia Rosa MD        ROS  Review  of Systems   Constitutional: Negative.    HENT: Negative.     Eyes: Negative.    Respiratory: Negative.     Cardiovascular: Negative.    Gastrointestinal: Negative.    Endocrine: Negative.    Genitourinary:  Positive for enuresis and urgency.   Musculoskeletal: Negative.    Neurological: Negative.    Psychiatric/Behavioral: Negative.          POC Blood, Urine   Date Value Ref Range Status   12/11/2023 TRACE-Lysed (A) NEGATIVE Final     Poc Nitrite, Urine   Date Value Ref Range Status   12/11/2023 NEGATIVE NEGATIVE Final     POC Urobilinogen, Urine   Date Value Ref Range Status   12/11/2023 4.0 (A) 0.2, 1.0 EU/DL Final     POC Leukocytes, Urine   Date Value Ref Range Status   12/11/2023 SMALL (1+) (A) NEGATIVE Final         PHYSICAL EXAM:    There were no vitals taken for this visit.  No LMP recorded. Patient has had a hysterectomy.      Declines chaperone for physical exam.      Well developed, well nourished, in no apparent distress.   Neurologic/Psychiatric:  Awake, Alert and Oriented times 3.  Affect normal.         Data and DIAGNOSTIC STUDIES REVIEWED   No results found.   Lab Results   Component Value Date    URINECULTURE NO GROWTH 08/28/2023      Lab Results   Component Value Date    GLUCOSE 213 (H) 03/12/2024    CALCIUM 9.9 03/12/2024     03/12/2024    K 4.5 03/12/2024    CO2 29 03/12/2024     03/12/2024    BUN 17 03/12/2024    CREATININE 0.93 03/12/2024     Lab Results   Component Value Date    WBC 7.1 03/12/2024    HGB 14.0 03/12/2024    HCT 44.7 03/12/2024    MCV 93 03/12/2024     03/12/2024          Silvia Rosa MD

## 2024-09-16 ENCOUNTER — OFFICE VISIT (OUTPATIENT)
Dept: OBSTETRICS AND GYNECOLOGY | Facility: CLINIC | Age: 64
End: 2024-09-16
Payer: MEDICARE

## 2024-09-16 VITALS
WEIGHT: 217 LBS | DIASTOLIC BLOOD PRESSURE: 55 MMHG | HEIGHT: 62 IN | SYSTOLIC BLOOD PRESSURE: 107 MMHG | HEART RATE: 76 BPM | BODY MASS INDEX: 39.93 KG/M2

## 2024-09-16 DIAGNOSIS — N39.0 ACUTE LOWER UTI: Primary | ICD-10-CM

## 2024-09-16 DIAGNOSIS — N32.81 OVERACTIVE BLADDER: ICD-10-CM

## 2024-09-16 LAB
POC APPEARANCE, URINE: ABNORMAL
POC BILIRUBIN, URINE: NEGATIVE
POC BLOOD, URINE: NEGATIVE
POC COLOR, URINE: YELLOW
POC GLUCOSE, URINE: NEGATIVE MG/DL
POC KETONES, URINE: NEGATIVE MG/DL
POC LEUKOCYTES, URINE: ABNORMAL
POC NITRITE,URINE: POSITIVE
POC PH, URINE: 5.5 PH
POC PROTEIN, URINE: NEGATIVE MG/DL
POC SPECIFIC GRAVITY, URINE: >=1.03
POC UROBILINOGEN, URINE: 0.2 EU/DL

## 2024-09-16 PROCEDURE — 51798 US URINE CAPACITY MEASURE: CPT | Performed by: OBSTETRICS & GYNECOLOGY

## 2024-09-16 PROCEDURE — 3074F SYST BP LT 130 MM HG: CPT | Performed by: OBSTETRICS & GYNECOLOGY

## 2024-09-16 PROCEDURE — 3048F LDL-C <100 MG/DL: CPT | Performed by: OBSTETRICS & GYNECOLOGY

## 2024-09-16 PROCEDURE — 3078F DIAST BP <80 MM HG: CPT | Performed by: OBSTETRICS & GYNECOLOGY

## 2024-09-16 PROCEDURE — 1123F ACP DISCUSS/DSCN MKR DOCD: CPT | Performed by: OBSTETRICS & GYNECOLOGY

## 2024-09-16 PROCEDURE — 1157F ADVNC CARE PLAN IN RCRD: CPT | Performed by: OBSTETRICS & GYNECOLOGY

## 2024-09-16 PROCEDURE — 99214 OFFICE O/P EST MOD 30 MIN: CPT | Performed by: OBSTETRICS & GYNECOLOGY

## 2024-09-16 PROCEDURE — 1126F AMNT PAIN NOTED NONE PRSNT: CPT | Performed by: OBSTETRICS & GYNECOLOGY

## 2024-09-16 PROCEDURE — 4010F ACE/ARB THERAPY RXD/TAKEN: CPT | Performed by: OBSTETRICS & GYNECOLOGY

## 2024-09-16 PROCEDURE — 81003 URINALYSIS AUTO W/O SCOPE: CPT | Performed by: OBSTETRICS & GYNECOLOGY

## 2024-09-16 PROCEDURE — 3008F BODY MASS INDEX DOCD: CPT | Performed by: OBSTETRICS & GYNECOLOGY

## 2024-09-16 PROCEDURE — 1159F MED LIST DOCD IN RCRD: CPT | Performed by: OBSTETRICS & GYNECOLOGY

## 2024-09-16 PROCEDURE — 87086 URINE CULTURE/COLONY COUNT: CPT | Performed by: OBSTETRICS & GYNECOLOGY

## 2024-09-16 PROCEDURE — 3052F HG A1C>EQUAL 8.0%<EQUAL 9.0%: CPT | Performed by: OBSTETRICS & GYNECOLOGY

## 2024-09-16 RX ORDER — VIBEGRON 75 MG/1
1 TABLET, FILM COATED ORAL DAILY
Qty: 90 TABLET | Refills: 3 | Status: SHIPPED | OUTPATIENT
Start: 2024-09-16 | End: 2024-12-15

## 2024-09-16 RX ORDER — SOLIFENACIN SUCCINATE 10 MG/1
10 TABLET, FILM COATED ORAL DAILY
Qty: 90 TABLET | Refills: 3 | Status: SHIPPED | OUTPATIENT
Start: 2024-09-16 | End: 2024-12-15

## 2024-09-16 RX ORDER — NITROFURANTOIN 25; 75 MG/1; MG/1
100 CAPSULE ORAL EVERY 12 HOURS SCHEDULED
Qty: 14 CAPSULE | Refills: 0 | Status: SHIPPED | OUTPATIENT
Start: 2024-09-16 | End: 2024-09-23

## 2024-09-16 ASSESSMENT — ENCOUNTER SYMPTOMS
RESPIRATORY NEGATIVE: 1
GASTROINTESTINAL NEGATIVE: 1
MUSCULOSKELETAL NEGATIVE: 1
NEUROLOGICAL NEGATIVE: 1
CARDIOVASCULAR NEGATIVE: 1
EYES NEGATIVE: 1
ENDOCRINE NEGATIVE: 1
PSYCHIATRIC NEGATIVE: 1
CONSTITUTIONAL NEGATIVE: 1

## 2024-09-16 ASSESSMENT — PAIN SCALES - GENERAL: PAINLEVEL: 0-NO PAIN

## 2024-09-17 ENCOUNTER — PREP FOR PROCEDURE (OUTPATIENT)
Dept: OBSTETRICS AND GYNECOLOGY | Facility: CLINIC | Age: 64
End: 2024-09-17
Payer: MEDICARE

## 2024-09-17 DIAGNOSIS — N32.81 OVERACTIVE BLADDER: Primary | ICD-10-CM

## 2024-09-17 DIAGNOSIS — N39.41 URGE INCONTINENCE: ICD-10-CM

## 2024-09-17 RX ORDER — ACETAMINOPHEN 325 MG/1
975 TABLET ORAL ONCE
OUTPATIENT
Start: 2024-09-17 | End: 2024-09-17

## 2024-09-17 RX ORDER — GABAPENTIN 600 MG/1
600 TABLET ORAL ONCE
OUTPATIENT
Start: 2024-09-17 | End: 2024-09-17

## 2024-09-17 RX ORDER — PHENAZOPYRIDINE HYDROCHLORIDE 200 MG/1
200 TABLET, FILM COATED ORAL ONCE
OUTPATIENT
Start: 2024-09-17 | End: 2024-09-17

## 2024-09-17 RX ORDER — CELECOXIB 400 MG/1
400 CAPSULE ORAL ONCE
OUTPATIENT
Start: 2024-09-17 | End: 2024-09-17

## 2024-09-17 RX ORDER — SODIUM CHLORIDE, SODIUM LACTATE, POTASSIUM CHLORIDE, CALCIUM CHLORIDE 600; 310; 30; 20 MG/100ML; MG/100ML; MG/100ML; MG/100ML
20 INJECTION, SOLUTION INTRAVENOUS CONTINUOUS
OUTPATIENT
Start: 2024-09-17

## 2024-09-19 LAB — BACTERIA UR CULT: ABNORMAL

## 2024-09-25 ENCOUNTER — HOSPITAL ENCOUNTER (OUTPATIENT)
Facility: HOSPITAL | Age: 64
Setting detail: OUTPATIENT SURGERY
End: 2024-09-25
Attending: OBSTETRICS & GYNECOLOGY | Admitting: OBSTETRICS & GYNECOLOGY
Payer: MEDICARE

## 2024-09-25 PROBLEM — N32.81 OVERACTIVE BLADDER: Status: ACTIVE | Noted: 2024-09-17

## 2024-09-25 PROBLEM — N39.41 URGE INCONTINENCE: Status: ACTIVE | Noted: 2024-09-17

## 2024-10-08 DIAGNOSIS — E78.2 MIXED HYPERLIPIDEMIA: ICD-10-CM

## 2024-10-08 DIAGNOSIS — I10 PRIMARY HYPERTENSION: ICD-10-CM

## 2024-10-23 RX ORDER — IBUPROFEN 600 MG/1
1 TABLET ORAL EVERY 6 HOURS PRN
COMMUNITY
Start: 2024-09-19

## 2024-10-23 RX ORDER — ACETAMINOPHEN 500 MG
1 TABLET ORAL EVERY 6 HOURS PRN
COMMUNITY
Start: 2024-09-19

## 2024-10-25 DIAGNOSIS — F32.1 DEPRESSION, MAJOR, SINGLE EPISODE, MODERATE (MULTI): ICD-10-CM

## 2024-10-25 DIAGNOSIS — F32.A ANXIETY AND DEPRESSION: ICD-10-CM

## 2024-10-25 DIAGNOSIS — E06.3 HYPOTHYROIDISM DUE TO HASHIMOTO'S THYROIDITIS: ICD-10-CM

## 2024-10-25 DIAGNOSIS — F41.9 ANXIETY AND DEPRESSION: ICD-10-CM

## 2024-10-25 DIAGNOSIS — I10 PRIMARY HYPERTENSION: ICD-10-CM

## 2024-10-25 RX ORDER — SERTRALINE HYDROCHLORIDE 100 MG/1
100 TABLET, FILM COATED ORAL NIGHTLY
Qty: 90 TABLET | Refills: 1 | Status: SHIPPED | OUTPATIENT
Start: 2024-10-25 | End: 2025-04-23

## 2024-10-25 RX ORDER — VALSARTAN 80 MG/1
80 TABLET ORAL DAILY
Qty: 90 TABLET | Refills: 1 | Status: SHIPPED | OUTPATIENT
Start: 2024-10-25 | End: 2025-10-25

## 2024-10-25 RX ORDER — LEVOTHYROXINE SODIUM 125 UG/1
125 TABLET ORAL DAILY
Qty: 30 TABLET | Refills: 1 | Status: SHIPPED | OUTPATIENT
Start: 2024-10-25 | End: 2025-10-25

## 2024-10-25 NOTE — TELEPHONE ENCOUNTER
Patient requesting refills of :   Levothyroxine   Sertraline   Valsartan   Next ov : 11-   Pend med

## 2024-11-13 ENCOUNTER — TELEPHONE (OUTPATIENT)
Dept: OBSTETRICS AND GYNECOLOGY | Facility: CLINIC | Age: 64
End: 2024-11-13
Payer: MEDICARE

## 2024-11-13 NOTE — TELEPHONE ENCOUNTER
Called pt to schedule PNE procedure, but she is going to court for custody of her grandchildren today and wants to wait to schedule. Agreed to contact her after the Thanksgiving holiday.

## 2024-11-15 NOTE — PROGRESS NOTES
"Counseling:  The patient was counseled regarding diagnostic results, instructions for management, risk factor reductions, prognosis, patient and family education, impressions, risks and benefits of treatment options and importance of compliance with treatment.       Chief Complaint:   The patient presents today for annual followup of CAD and hyperlipidemia.       History Of Present Illness:    Rajni Casiano is a 63 year old female patient who presents today for overdue annual followup of CAD and hyperlipidemia. Her PMH is significant for CAD s/p CABG x3 11/2016, DM, hyperlipidemia, HTN, hypothyroidism, idiopathic hypersomnia, morbid obesity and GODWIN (on CPAP). Over the past year, the patient states that she has done well from a cardiac standpoint. She denies any CP, chest discomfort or SOB. BP has been stable. EKG today shows NSR with no acute changes. The patient is compliant with her prescribed medications.      Last Recorded Vitals:  Vitals:    11/19/24 1504   BP: 128/64   BP Location: Left arm   Pulse: 56   Weight: 99.8 kg (220 lb)   Height: 1.59 m (5' 2.58\")       Past Surgical History:  She has a past surgical history that includes Hysterectomy (07/03/2013); Cataract extraction (07/03/2013); Neck surgery (07/03/2013); and Coronary artery bypass graft (01/12/2017).      Social History:  She reports that she has never smoked. She has never been exposed to tobacco smoke. She has never used smokeless tobacco. She reports that she does not currently use alcohol. She reports that she does not currently use drugs.    Family History:  Family History   Problem Relation Name Age of Onset    Breast cancer Mother  80        Allergies:  Clarithromycin and Modafinil    Outpatient Medications:  Current Outpatient Medications   Medication Instructions    acetaminophen (Tylenol) 500 mg tablet 1 tablet, Every 6 hours PRN    atorvastatin (LIPITOR) 80 mg, oral, Nightly    BD Insulin Syringe Ultra-Fine 0.5 mL 31 gauge x 5/16\" syringe " USE THREE TIMES A DAY    gabapentin (NEURONTIN) 300 mg, oral, Nightly    Gemtesa 75 mg, oral, Daily    Gvoke HypoPen 2-Pack 1 mg/0.2 mL auto-injector USE AS DIRECTED FOR HYPOGLYCEMIA  REPEAT IN 15 MINUTES IF NEEDED     mg tablet 1 tablet, Every 6 hours PRN    insulin regular (HumuLIN R U-500) 500 unit/mL CONCENTRATED injection .25 mL brkfst, .25 mL lunch, .10 mL dinner Subcutaneous with meals for 90 days    levothyroxine (SYNTHROID, LEVOXYL) 150 mcg, oral, Daily    levothyroxine (SYNTHROID, LEVOXYL) 125 mcg, oral, Daily    meclizine (Antivert) 12.5 mg tablet Take by mouth.    methylphenidate (RITALIN) 20 mg, oral, 2 times daily    methylphenidate (RITALIN) 20 mg, oral, 2 times daily    methylphenidate (RITALIN) 20 mg, oral, 2 times daily    metoprolol tartrate (LOPRESSOR) 25 mg, oral, 2 times daily    Mounjaro 7.5 mg/0.5 mL pen injector     sertraline (ZOLOFT) 100 mg, oral, Nightly    solifenacin (VESICARE) 10 mg, oral, Daily    valsartan (DIOVAN) 80 mg, oral, Daily     Review of Systems   All other systems reviewed and are negative.     Physical Exam:  Constitutional:       Appearance: Healthy appearance. Not in distress.   Neck:      Vascular: No JVR. JVD normal.   Pulmonary:      Effort: Pulmonary effort is normal.      Breath sounds: Normal breath sounds. No wheezing. No rhonchi. No rales.   Chest:      Chest wall: Not tender to palpatation.   Cardiovascular:      PMI at left midclavicular line. Normal rate. Regular rhythm. Normal S1. Normal S2.       Murmurs: There is no murmur.      No gallop.  No click. No rub.   Pulses:     Intact distal pulses.   Edema:     Peripheral edema absent.   Abdominal:      General: Bowel sounds are normal.      Palpations: Abdomen is soft.      Tenderness: There is no abdominal tenderness.   Musculoskeletal: Normal range of motion.         General: No tenderness. Skin:     General: Skin is warm and dry.   Neurological:      General: No focal deficit present.      Mental  Status: Alert and oriented to person, place and time.          Last Labs:  CBC -  Lab Results   Component Value Date    WBC 7.1 03/12/2024    HGB 14.0 03/12/2024    HCT 44.7 03/12/2024    MCV 93 03/12/2024     03/12/2024       CMP -  Lab Results   Component Value Date    CALCIUM 9.9 03/12/2024    PROT 7.2 03/12/2024    ALBUMIN 4.0 03/12/2024    AST 28 03/12/2024    ALT 27 03/12/2024    ALKPHOS 99 03/12/2024    BILITOT 0.6 03/12/2024       LIPID PANEL -   Lab Results   Component Value Date    CHOL 148 03/12/2024    TRIG 150 (H) 03/12/2024    HDL 43.4 03/12/2024    CHHDL 3.4 03/12/2024    LDLF 69 01/18/2023    VLDL 30 03/12/2024    NHDL 105 03/12/2024       RENAL FUNCTION PANEL -   Lab Results   Component Value Date    GLUCOSE 213 (H) 03/12/2024     03/12/2024    K 4.5 03/12/2024     03/12/2024    CO2 29 03/12/2024    ANIONGAP 16 03/12/2024    BUN 17 03/12/2024    CREATININE 0.93 03/12/2024    CALCIUM 9.9 03/12/2024    ALBUMIN 4.0 03/12/2024        Lab Results   Component Value Date    BNP 73 07/10/2018    HGBA1C 8.7 (H) 03/12/2024       Last Cardiology Tests:  07/18/2022 - Stress Test  1. No clinical or electrocardiographic evidence for ischemia at maximal infusion.  2. Normal stress myocardial perfusion imaging in response to pharmacologic stress. Well-maintained left ventricular function.      04/20/2021 - TTE  1. The left ventricular systolic function is normal with a 55-60% estimated ejection fraction.  2. There is mild asymmetric left ventricular hypertrophy.     11/01/2018 - Cardiac Catheterization (RH)  Normal right heart pressures.      10/11/2018 - Echocardiogram  1. This was a very technically difficult study with very suboptimal imaging. Limited diagnostic accuracy is to be expected. Clinical correlation is required. Additional imaging modalities may need to be considered.   2. Normal left ventricular size and regional systolic function with an ejection fraction of 55%.     10/11/2018 -  CT Chest  1. There is no CT evidence of interstitial lung disease.  2. There is a 2 mm nodule in the right middle lobe. According to latest Fleischner criteria-SOLID NODULE <6MM- No follow-up imaging is recommended. If there are risk factors for lung malignancy, a follow-up chest CT exam could be obtained in 12 months.  3. No thoracic lymphadenopathy.  4. Cardiovascular findings as discussed in the body of the report.  5. Nonspecific subcutaneous fat stranding is seen in the anterior abdominal wall bilaterally is only partially included on the field of view. Clinical correlation is recommended.      12/08/2017 - Nuclear Stress Test  1. Normal stress myocardial perfusion imaging in response to pharmacologic stress.  2. Well-maintained left ventricular function.      12/08/2017 - Regadenoson Myocardial Perfusion Stress Test  Normal regadenoson stress test. MPI to follow.     11/21/2017 - Echocardiogram  1. Normal left ventricular size and regional systolic function with an ejection fraction of 65%.  2. Normal right ventricular systolic function.  3. Trace mitral regurgitation.  4. Trace tricuspid regurgitation.     Lab review: I have personally reviewed the laboratory result(s).    Assessment/Plan   1) CAD S/P CABG Nov. 2016  On atorvastatin 80 mg daily, metoprolol tartrate 25 mg BID, valsartan 80 mg daily  TTE 04/20/2021 with LVEF 55-60%, mild asymmetric LVH   Stress 07/18/2022 negative for ischemia   Denies CP, chest discomfort or SOB  BP stable  EKG stable  Continue current medical Rx   F/U 1 year      2) SOB, Chronic Nonproductive Cough  PFTs suggestive of Interstitial Lung disease  CT chest Oct. 2018 shows no ILD  Right heart cath 2018 with normal right heart hemodynamics  Negative stress test 07/2022   Increase physical activity   Denies SOB currently      3) Hyperlipidemia  Goal LDL <70  Not on statin therapy   Counseled regarding diet and exercise  Lipid panel 03/12/2024 with LDL of 75; near goal  F/U 1 year       4) DM  Management per endocrinology  Intolerant of Mounjaro s/t diarrhea    5) Hypothyroidism  Management per PCP  TSH 03/12/2024 low at 0.10  Recommend followup with PCP             Scribe Attestation  By signing my name below, I, Brain Mai   attest that this documentation has been prepared under the direction and in the presence of Sergio Bansal MD.

## 2024-11-19 ENCOUNTER — OFFICE VISIT (OUTPATIENT)
Dept: CARDIOLOGY | Facility: HOSPITAL | Age: 64
End: 2024-11-19
Payer: MEDICARE

## 2024-11-19 VITALS
DIASTOLIC BLOOD PRESSURE: 64 MMHG | BODY MASS INDEX: 38.98 KG/M2 | HEIGHT: 63 IN | HEART RATE: 56 BPM | WEIGHT: 220 LBS | SYSTOLIC BLOOD PRESSURE: 128 MMHG

## 2024-11-19 DIAGNOSIS — E78.2 MIXED HYPERLIPIDEMIA: Primary | ICD-10-CM

## 2024-11-19 PROCEDURE — 93010 ELECTROCARDIOGRAM REPORT: CPT | Performed by: INTERNAL MEDICINE

## 2024-11-19 PROCEDURE — 4010F ACE/ARB THERAPY RXD/TAKEN: CPT | Performed by: INTERNAL MEDICINE

## 2024-11-19 PROCEDURE — 3078F DIAST BP <80 MM HG: CPT | Performed by: INTERNAL MEDICINE

## 2024-11-19 PROCEDURE — 3048F LDL-C <100 MG/DL: CPT | Performed by: INTERNAL MEDICINE

## 2024-11-19 PROCEDURE — 3074F SYST BP LT 130 MM HG: CPT | Performed by: INTERNAL MEDICINE

## 2024-11-19 PROCEDURE — 99213 OFFICE O/P EST LOW 20 MIN: CPT | Performed by: INTERNAL MEDICINE

## 2024-11-19 PROCEDURE — 1123F ACP DISCUSS/DSCN MKR DOCD: CPT | Performed by: INTERNAL MEDICINE

## 2024-11-19 PROCEDURE — 3052F HG A1C>EQUAL 8.0%<EQUAL 9.0%: CPT | Performed by: INTERNAL MEDICINE

## 2024-11-19 PROCEDURE — 1157F ADVNC CARE PLAN IN RCRD: CPT | Performed by: INTERNAL MEDICINE

## 2024-11-19 PROCEDURE — 1036F TOBACCO NON-USER: CPT | Performed by: INTERNAL MEDICINE

## 2024-11-19 PROCEDURE — 1159F MED LIST DOCD IN RCRD: CPT | Performed by: INTERNAL MEDICINE

## 2024-11-19 PROCEDURE — 3008F BODY MASS INDEX DOCD: CPT | Performed by: INTERNAL MEDICINE

## 2024-11-19 PROCEDURE — 1160F RVW MEDS BY RX/DR IN RCRD: CPT | Performed by: INTERNAL MEDICINE

## 2024-11-19 PROCEDURE — 93005 ELECTROCARDIOGRAM TRACING: CPT | Performed by: INTERNAL MEDICINE

## 2024-11-19 ASSESSMENT — ENCOUNTER SYMPTOMS
DEPRESSION: 0
OCCASIONAL FEELINGS OF UNSTEADINESS: 1
LOSS OF SENSATION IN FEET: 1

## 2024-11-22 ENCOUNTER — LAB (OUTPATIENT)
Dept: LAB | Facility: LAB | Age: 64
End: 2024-11-22
Payer: MEDICARE

## 2024-11-22 DIAGNOSIS — E06.3 HYPOTHYROIDISM DUE TO HASHIMOTO'S THYROIDITIS: ICD-10-CM

## 2024-11-22 DIAGNOSIS — N32.81 OVERACTIVE BLADDER: ICD-10-CM

## 2024-11-22 LAB
ANION GAP SERPL CALC-SCNC: 12 MMOL/L (ref 10–20)
BUN SERPL-MCNC: 19 MG/DL (ref 6–23)
CALCIUM SERPL-MCNC: 9.2 MG/DL (ref 8.6–10.3)
CHLORIDE SERPL-SCNC: 98 MMOL/L (ref 98–107)
CO2 SERPL-SCNC: 29 MMOL/L (ref 21–32)
CREAT SERPL-MCNC: 1.04 MG/DL (ref 0.5–1.05)
EGFRCR SERPLBLD CKD-EPI 2021: 59 ML/MIN/1.73M*2
ERYTHROCYTE [DISTWIDTH] IN BLOOD BY AUTOMATED COUNT: 13.1 % (ref 11.5–14.5)
GLUCOSE SERPL-MCNC: 264 MG/DL (ref 74–99)
HCT VFR BLD AUTO: 44.3 % (ref 36–46)
HGB BLD-MCNC: 14.3 G/DL (ref 12–16)
MCH RBC QN AUTO: 30.6 PG (ref 26–34)
MCHC RBC AUTO-ENTMCNC: 32.3 G/DL (ref 32–36)
MCV RBC AUTO: 95 FL (ref 80–100)
NRBC BLD-RTO: 0 /100 WBCS (ref 0–0)
PLATELET # BLD AUTO: 184 X10*3/UL (ref 150–450)
POTASSIUM SERPL-SCNC: 4.5 MMOL/L (ref 3.5–5.3)
RBC # BLD AUTO: 4.68 X10*6/UL (ref 4–5.2)
SODIUM SERPL-SCNC: 134 MMOL/L (ref 136–145)
TSH SERPL-ACNC: 2.15 MIU/L (ref 0.44–3.98)
WBC # BLD AUTO: 6 X10*3/UL (ref 4.4–11.3)

## 2024-11-22 PROCEDURE — 36415 COLL VENOUS BLD VENIPUNCTURE: CPT

## 2024-11-22 PROCEDURE — 84443 ASSAY THYROID STIM HORMONE: CPT

## 2024-11-22 PROCEDURE — 80048 BASIC METABOLIC PNL TOTAL CA: CPT

## 2024-11-22 PROCEDURE — 85027 COMPLETE CBC AUTOMATED: CPT

## 2024-11-26 ENCOUNTER — APPOINTMENT (OUTPATIENT)
Dept: PRIMARY CARE | Facility: CLINIC | Age: 64
End: 2024-11-26
Payer: MEDICARE

## 2024-11-26 VITALS
WEIGHT: 219 LBS | TEMPERATURE: 98 F | SYSTOLIC BLOOD PRESSURE: 130 MMHG | BODY MASS INDEX: 39.32 KG/M2 | HEART RATE: 64 BPM | DIASTOLIC BLOOD PRESSURE: 60 MMHG | OXYGEN SATURATION: 98 %

## 2024-11-26 DIAGNOSIS — Z23 FLU VACCINE NEED: ICD-10-CM

## 2024-11-26 DIAGNOSIS — N39.46 MIXED STRESS AND URGE URINARY INCONTINENCE: ICD-10-CM

## 2024-11-26 DIAGNOSIS — I25.10 CORONARY ARTERY DISEASE INVOLVING NATIVE CORONARY ARTERY OF NATIVE HEART, UNSPECIFIED WHETHER ANGINA PRESENT: ICD-10-CM

## 2024-11-26 DIAGNOSIS — F41.9 ANXIETY AND DEPRESSION: ICD-10-CM

## 2024-11-26 DIAGNOSIS — F32.1 DEPRESSION, MAJOR, SINGLE EPISODE, MODERATE (MULTI): ICD-10-CM

## 2024-11-26 DIAGNOSIS — I10 ESSENTIAL HYPERTENSION: Primary | ICD-10-CM

## 2024-11-26 DIAGNOSIS — E66.01 MORBID OBESITY (MULTI): ICD-10-CM

## 2024-11-26 DIAGNOSIS — F32.A ANXIETY AND DEPRESSION: ICD-10-CM

## 2024-11-26 DIAGNOSIS — E11.3293 TYPE 2 DIABETES MELLITUS WITH BOTH EYES AFFECTED BY MILD NONPROLIFERATIVE RETINOPATHY WITHOUT MACULAR EDEMA, WITHOUT LONG-TERM CURRENT USE OF INSULIN: ICD-10-CM

## 2024-11-26 DIAGNOSIS — E06.3 HYPOTHYROIDISM DUE TO HASHIMOTO THYROIDITIS: ICD-10-CM

## 2024-11-26 DIAGNOSIS — I10 PRIMARY HYPERTENSION: ICD-10-CM

## 2024-11-26 DIAGNOSIS — E78.2 MIXED HYPERLIPIDEMIA: ICD-10-CM

## 2024-11-26 DIAGNOSIS — N76.0 ACUTE VAGINITIS: ICD-10-CM

## 2024-11-26 PROCEDURE — 1123F ACP DISCUSS/DSCN MKR DOCD: CPT | Performed by: FAMILY MEDICINE

## 2024-11-26 PROCEDURE — 3052F HG A1C>EQUAL 8.0%<EQUAL 9.0%: CPT | Performed by: FAMILY MEDICINE

## 2024-11-26 PROCEDURE — 3075F SYST BP GE 130 - 139MM HG: CPT | Performed by: FAMILY MEDICINE

## 2024-11-26 PROCEDURE — 1036F TOBACCO NON-USER: CPT | Performed by: FAMILY MEDICINE

## 2024-11-26 PROCEDURE — 99214 OFFICE O/P EST MOD 30 MIN: CPT | Performed by: FAMILY MEDICINE

## 2024-11-26 PROCEDURE — 1157F ADVNC CARE PLAN IN RCRD: CPT | Performed by: FAMILY MEDICINE

## 2024-11-26 PROCEDURE — 3048F LDL-C <100 MG/DL: CPT | Performed by: FAMILY MEDICINE

## 2024-11-26 PROCEDURE — 3078F DIAST BP <80 MM HG: CPT | Performed by: FAMILY MEDICINE

## 2024-11-26 PROCEDURE — G2211 COMPLEX E/M VISIT ADD ON: HCPCS | Performed by: FAMILY MEDICINE

## 2024-11-26 PROCEDURE — 1159F MED LIST DOCD IN RCRD: CPT | Performed by: FAMILY MEDICINE

## 2024-11-26 PROCEDURE — 4010F ACE/ARB THERAPY RXD/TAKEN: CPT | Performed by: FAMILY MEDICINE

## 2024-11-26 RX ORDER — ATORVASTATIN CALCIUM 80 MG/1
80 TABLET, FILM COATED ORAL NIGHTLY
Qty: 90 TABLET | Refills: 1 | Status: SHIPPED | OUTPATIENT
Start: 2024-11-26

## 2024-11-26 RX ORDER — VALSARTAN 80 MG/1
80 TABLET ORAL DAILY
Qty: 90 TABLET | Refills: 1 | Status: SHIPPED | OUTPATIENT
Start: 2024-11-26 | End: 2025-11-26

## 2024-11-26 RX ORDER — FLUCONAZOLE 150 MG/1
150 TABLET ORAL ONCE
Qty: 1 TABLET | Refills: 0 | Status: SHIPPED | OUTPATIENT
Start: 2024-11-26 | End: 2024-11-26

## 2024-11-26 RX ORDER — SERTRALINE HYDROCHLORIDE 100 MG/1
100 TABLET, FILM COATED ORAL NIGHTLY
Qty: 90 TABLET | Refills: 1 | Status: SHIPPED | OUTPATIENT
Start: 2024-11-26 | End: 2025-05-25

## 2024-11-26 NOTE — ASSESSMENT & PLAN NOTE
Stable with recent flare due to recent events.  Refilling meds at same dose.  Declines counseling or further help.  Continue to monitor

## 2024-11-26 NOTE — ASSESSMENT & PLAN NOTE
Stable  Reviewed recent labs.  Refilling meds at same dose.  Follow-up as scheduled with new physician in 3 to 6 months

## 2024-11-26 NOTE — PROGRESS NOTES
Subjective   Patient ID: Rajni Casiano is a 64 y.o. female who presents for Follow-up (MEDICATION REFILL ).    Here for general check and med refill.    Patient has a history of hypertension, hyperlipidemia, diabetes, CAD, depression, hypothyroidism and urine incontinence.   Seeing cardiology, endo and gyn regular.   Cardiology last week  Endocrinology regular for DM    Mom passed in June  Aunt passed after and now  Pt's grandaughter recently passed of possible overdose.   She is caring for her son, great grandson.   Legal custody of 2 of her great grandkids.  Has been doing fair  Taking her sertraline and helping to keep her stable.   Home BS checks regular  Has been up due to stress.     PAP- Hysterectomy   MAMM- 3/24  Bone density 3/24  Colon cancer screening with Cologuard 3/21           Review of Systems    Objective   /60   Pulse 64   Temp 36.7 °C (98 °F) (Temporal)   Wt 99.3 kg (219 lb)   SpO2 98%   BMI 39.32 kg/m²     Physical Exam  Vitals and nursing note reviewed.   Constitutional:       Appearance: Normal appearance.   Cardiovascular:      Rate and Rhythm: Normal rate and regular rhythm.   Pulmonary:      Effort: Pulmonary effort is normal.      Breath sounds: Normal breath sounds.   Musculoskeletal:      Cervical back: Normal range of motion.   Neurological:      Mental Status: She is alert.   Psychiatric:         Mood and Affect: Mood normal.         Behavior: Behavior normal.         Thought Content: Thought content normal.         Judgment: Judgment normal.         Assessment/Plan   Problem List Items Addressed This Visit             ICD-10-CM    Coronary artery disease I25.10     Stable  Continue care per cardiology         Depression, major, single episode, moderate (Multi) F32.1     Stable with recent flare due to recent events.  Refilling meds at same dose.  Declines counseling or further help.  Continue to monitor         Relevant Medications    sertraline (Zoloft) 100 mg tablet     Hyperlipidemia E78.5     Checking labs and treat accordingly         Relevant Medications    atorvastatin (Lipitor) 80 mg tablet    Other Relevant Orders    Lipid Panel    Essential hypertension - Primary I10     Stable  Reviewed recent labs.  Refilling meds at same dose.  Follow-up as scheduled with new physician in 3 to 6 months         Relevant Medications    valsartan (Diovan) 80 mg tablet    Hypothyroidism E03.9     Recent TSH normal.  Continue present dose of meds         Morbid obesity (Multi) E66.01     Stable  Continue weight loss efforts         Type 2 diabetes mellitus with mild nonproliferative diabetic retinopathy without macular edema, bilateral E11.3293     Checking A1c level.  Continue present meds.  Continue care per endocrinology         Relevant Orders    Hemoglobin A1C    Mixed stress and urge urinary incontinence N39.46     Stable  Change in medication.  Continue care per GYN  Continue to monitor          Other Visit Diagnoses         Codes    Flu vaccine need     Z23    Relevant Orders    Flu vaccine, trivalent, preservative free, age 6 months and greater (Fluarix/Fluzone/Flulaval)    Primary hypertension     I10    Relevant Medications    valsartan (Diovan) 80 mg tablet    Anxiety and depression     F41.9, F32.A    Relevant Medications    sertraline (Zoloft) 100 mg tablet    Acute vaginitis     N76.0    Relevant Medications    fluconazole (Diflucan) 150 mg tablet

## 2024-12-11 ENCOUNTER — TELEPHONE (OUTPATIENT)
Dept: OBSTETRICS AND GYNECOLOGY | Facility: CLINIC | Age: 64
End: 2024-12-11
Payer: MEDICARE

## 2024-12-11 NOTE — TELEPHONE ENCOUNTER
Called pt to schedule PNE but this is not a good time for her still. Her granddaughter  last weekend from a fentanyl overdose - the mother of the children whom she obtained custody of last month. She is also helping watch the children of her daughter who is in the hospital now. She does still want to pursue the PNE but needs time to deal with life, so will let her get through the holidays and check in with her after the new year.

## 2024-12-17 ENCOUNTER — TELEPHONE (OUTPATIENT)
Dept: NEUROLOGY | Facility: HOSPITAL | Age: 64
End: 2024-12-17
Payer: MEDICARE

## 2024-12-17 NOTE — TELEPHONE ENCOUNTER
Requesting refill of Methylphenidate 20 mg BID please be sent to [facility].    Last visit 6/3/24    Next visit not scheduled.    Thank you.

## 2025-01-03 RX ORDER — ATORVASTATIN CALCIUM 80 MG/1
80 TABLET, FILM COATED ORAL NIGHTLY
Qty: 90 TABLET | Refills: 0 | OUTPATIENT
Start: 2025-01-03

## 2025-01-03 RX ORDER — VALSARTAN 80 MG/1
80 TABLET ORAL DAILY
Qty: 90 TABLET | Refills: 0 | OUTPATIENT
Start: 2025-01-03

## 2025-01-06 ENCOUNTER — TELEPHONE (OUTPATIENT)
Dept: NEUROLOGY | Facility: HOSPITAL | Age: 65
End: 2025-01-06
Payer: MEDICARE

## 2025-01-06 NOTE — TELEPHONE ENCOUNTER
Pt called to request refill of Methylphenidate 20 mg bid please be sent to [facility].  Thank you.    RAJENDRA 6/3/24  NV 1/20/25

## 2025-01-06 NOTE — TELEPHONE ENCOUNTER
I called pt to update her that refill can't be ordered until she is seen in office 1/20/25.    Pt didn't understand why, I explained that this is a controlled substance and she must be seen.  Still unhappy but verbalized understanding.    I called her back for more info.  States she has had 6 deaths in the family since June, including her mother.  Has custody of 2 great grandchildren and has not done anything the way she should have.      She had been taking the medication once a day only and was using what was left over to keep her supplied with the every day dose until now.  After more explanation she seemed to understand.  I wanted the pt to feel comfortable coming to office and not feel judged.

## 2025-01-20 ENCOUNTER — TELEMEDICINE (OUTPATIENT)
Dept: NEUROLOGY | Facility: HOSPITAL | Age: 65
End: 2025-01-20
Payer: MEDICARE

## 2025-01-20 DIAGNOSIS — G47.11 IDIOPATHIC HYPERSOMNIA: ICD-10-CM

## 2025-01-20 DIAGNOSIS — G25.81 RESTLESS LEGS SYNDROME: ICD-10-CM

## 2025-01-20 DIAGNOSIS — G47.33 OBSTRUCTIVE SLEEP APNEA: Primary | ICD-10-CM

## 2025-01-20 DIAGNOSIS — Z51.81 THERAPEUTIC DRUG MONITORING: ICD-10-CM

## 2025-01-20 PROCEDURE — 4010F ACE/ARB THERAPY RXD/TAKEN: CPT | Performed by: NURSE PRACTITIONER

## 2025-01-20 PROCEDURE — 1157F ADVNC CARE PLAN IN RCRD: CPT | Performed by: NURSE PRACTITIONER

## 2025-01-20 PROCEDURE — 99214 OFFICE O/P EST MOD 30 MIN: CPT | Performed by: NURSE PRACTITIONER

## 2025-01-20 PROCEDURE — 1160F RVW MEDS BY RX/DR IN RCRD: CPT | Performed by: NURSE PRACTITIONER

## 2025-01-20 PROCEDURE — 1123F ACP DISCUSS/DSCN MKR DOCD: CPT | Performed by: NURSE PRACTITIONER

## 2025-01-20 PROCEDURE — 1159F MED LIST DOCD IN RCRD: CPT | Performed by: NURSE PRACTITIONER

## 2025-01-20 PROCEDURE — 99214 OFFICE O/P EST MOD 30 MIN: CPT | Mod: GT,95 | Performed by: NURSE PRACTITIONER

## 2025-01-20 PROCEDURE — 1036F TOBACCO NON-USER: CPT | Performed by: NURSE PRACTITIONER

## 2025-01-20 NOTE — PROGRESS NOTES
[facility]    SUBJECTIVE    Rajni Casiano is a 64 y.o. right-handed female who presents with   Chief Complaint   Patient presents with    Follow-up        Presents for follow up visit  Visit type: virtual visit Virtual or Telephone Consent    An interactive audio and video telecommunication system which permits real time communications between the patient (at the originating site) and provider (at the distant site) was utilized to provide this telehealth service.   Verbal consent was requested and obtained from Rajni Casiano on this date, 01/20/25 for a telehealth visit.     HISTORY OF PRESENT ILLNESS    RECAP:  11/02/23 - Reports was doing well on ritalin, still sleepy throughout day but able to stay awake if doing something. Currently out of her medication including gabapentin as well. Feels the ritalin is similar in efficacy to the adderall for her.   Just got custody of two grandkids. One is 2.5 years old so needing to be awake to raise them.      CPAP download unavailable at this time.      ESS 17/24 today     Methylphenidate increased to 20 mg BID. Pt does have history of CAD s/p bypass per pt. States previously cleared by her cardiologist to be on stimulants. HR and BP are good. Discussed risk associated with increasing stimulant dosing, pt states understanding. UDS ordered - pt currently out of her methylphenidate. CSA signed today.     06/03/24 -      CPAP download data showed:   AirSense 11 Auto-CPAP  Date range: 2/11/24 - 2/29/24 (only data available - confirmed with [facility])  Auto-CPAP 6-20 cm H20, EPR 2  Pressure (cm H20): median 12.6, 95th percentile 16.3, and max 18.1  % used days >=4 hours: 95% of 19 days  Average daily usage (days used): 7:24 h  Median daily usage (days used): 7:27 h   Leak: not an issue  Residual AHI: 4.7     Pt is symptomatically benefiting from CPAP use.      Ran out of medication. Doing pretty well on the increase of methylphenidate.      Gabapentin working  well for RLS symptoms.      Has two CPAP machines, when camping uses the other machine and staying at mom's. Was staying at her mother's every night. Currently back home now because mother is in hospital, she is not doing well.     01/20/25 -     EPWORTH SLEEPINESS SCALE   Sitting and reading 3   Watching television 3   Sitting inactive in a public place 0   As passenger in car for an hour or more without a break 3   Lying down to rest in the afternoon when circumstances permit 3   Sitting and talking to someone 0   Sitting quietly after a lunch without alcohol 2   In a car while stopped for a few minutes in traffic 0   TOTAL 14/24     Nose stuffy recently with PAP when she is waking up in the morning.       REVIEW OF SYSTEMS:  10 point review of systems performed and is negative except as noted in the HPI.     Past Medical History:   Diagnosis Date    Anxiety     Arthritis     Bell palsy     Cataract     CTS (carpal tunnel syndrome)     Difficulty walking     Disease of thyroid gland     Heart disease     Hypertension     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)     Narcolepsy    Neuropathy in diabetes (Multi)     Numbness     Obstructive sleep apnea (adult) (pediatric)     Obstructive sleep apnea    Personal history of other diseases of the circulatory system     History of hypertension    Personal history of other endocrine, nutritional and metabolic disease     History of hypercholesterolemia    Personal history of other endocrine, nutritional and metabolic disease     History of hypothyroidism    Personal history of other endocrine, nutritional and metabolic disease     History of diabetes mellitus    Restless leg syndrome        Family medical history includes stroke in father.    Past Surgical History:   Procedure Laterality Date    CAROTID ENDARTERECTOMY      CARPAL TUNNEL RELEASE      CATARACT EXTRACTION  07/03/2013    Cataract Surgery    CORONARY ARTERY BYPASS GRAFT  01/12/2017    CABG    HYSTERECTOMY   "07/03/2013    Hysterectomy    NECK SURGERY  07/03/2013    Neck Surgery       Social History     Substance and Sexual Activity   Drug Use Never     Tobacco Use: Low Risk  (1/20/2025)    Patient History     Smoking Tobacco Use: Never     Smokeless Tobacco Use: Never     Passive Exposure: Never   Recent Concern: Tobacco Use - High Risk (11/5/2024)    [facility]    Patient History     Smoking Tobacco Use: Every Day     Smokeless Tobacco Use: Former     Passive Exposure: Not on file     Alcohol Use: Not on file       Current Outpatient Medications   Medication Instructions    acetaminophen (Tylenol) 500 mg tablet 1 tablet, Every 6 hours PRN    atorvastatin (LIPITOR) 80 mg, oral, Nightly    BD Insulin Syringe Ultra-Fine 0.5 mL 31 gauge x 5/16\" syringe USE THREE TIMES A DAY    gabapentin (NEURONTIN) 300 mg, oral, Nightly    Gvoke HypoPen 2-Pack 1 mg/0.2 mL auto-injector USE AS DIRECTED FOR HYPOGLYCEMIA  REPEAT IN 15 MINUTES IF NEEDED     mg tablet 1 tablet, Every 6 hours PRN    insulin regular (HumuLIN R U-500) 500 unit/mL CONCENTRATED injection .25 mL brkfst, .25 mL lunch, .10 mL dinner Subcutaneous with meals for 90 days    levothyroxine (SYNTHROID, LEVOXYL) 125 mcg, oral, Daily    meclizine (Antivert) 12.5 mg tablet Take by mouth.    methylphenidate (RITALIN) 20 mg, oral, 2 times daily    methylphenidate (RITALIN) 20 mg, oral, 2 times daily    methylphenidate (RITALIN) 20 mg, oral, 2 times daily    metoprolol tartrate (LOPRESSOR) 25 mg, oral, 2 times daily    sertraline (ZOLOFT) 100 mg, oral, Nightly    valsartan (DIOVAN) 80 mg, oral, Daily         OBJECTIVE    There were no vitals taken for this visit.      Physical Exam  Eyes:      General: Lids are normal.      Extraocular Movements: Extraocular movements intact.   Psychiatric:         Speech: Speech normal.       Neurological Exam  Mental Status  Awake, alert and oriented to person, place and time. Speech is normal. Language is fluent with " no aphasia. Attention and concentration are normal. Fund of knowledge is appropriate for level of education.    Cranial Nerves  CN III, IV, VI: Extraocular movements intact bilaterally. Normal lids and orbits bilaterally.  CN VII: Full and symmetric facial movement.  CN VIII: Hearing is normal.    Motor   No abnormal involuntary movements.        No results found for this or any previous visit from the past 1825 days.      Lab Results   Component Value Date    WBC 6.0 11/22/2024    RBC 4.68 11/22/2024    HGB 14.3 11/22/2024    HCT 44.3 11/22/2024     11/22/2024     (L) 11/22/2024    K 4.5 11/22/2024    CL 98 11/22/2024    BUN 19 11/22/2024    CREATININE 1.04 11/22/2024    EGFR 59 (L) 11/22/2024    CALCIUM 9.2 11/22/2024    ALKPHOS 99 03/12/2024    AST 28 03/12/2024    ALT 27 03/12/2024    VITD25 22 (A) 07/17/2018    HGBA1C 8.7 (H) 03/12/2024    LDLCALC 75 03/12/2024    CHOL 148 03/12/2024    HDL 43.4 03/12/2024    TRIG 150 (H) 03/12/2024    TSH 2.15 11/22/2024          ASSESSMENT & PLAN  Problem List Items Addressed This Visit       Idiopathic hypersomnia    Overview     Has prior narcolepsy diagnosis but no supporting MSLT  Sleep attacks controlled on stimulant but still sleepy overall.   No cataplexy.   Failed wakix (side effects) and vyvanse (no help per pt)  S/p adderall 30 mg am and 10 mg early afternoon (changed to ritalin due to shortage)  On methylphenidate 10 mg BID. Still very sleepy.          Current Assessment & Plan     Pt had break in therapy due to external life stressors. Is now looking to get back on methylphenidate.   Not filled since June 2024.   [place] reviewed, no variances  UDS ordered (last done 11/2023)  CSA needs updated, will send to patient for signature  Discussed controlled substances requirements with patient. When UDS is done, will send refills of methylphenidate. Needs at least 1 follow up in person per year.         Restless legs syndrome    Overview     Well controlled  on gabapentin 300 mg at night         Relevant Orders    Follow Up In Neurology    Obstructive sleep apnea - Primary    Overview     DME is [facility]  ResMed device  AutoCPAP 6-20 cm H2O  Has 2nd CPAP for traveling         Current Assessment & Plan     C/o stuffiness with PAP use. To call [facility] to help her walk through her settings to adjust her humidity on device.          Relevant Orders    Follow Up In Neurology     Other Visit Diagnoses       Therapeutic drug monitoring        Relevant Orders    Drug Screen, Urine With Reflex to Confirmation    Follow Up In Neurology          OARRS:  BRYANT Rodriguez on 1/20/2025 10:38 AM  I have personally reviewed the [place]AR[place] report for Rajni Casiano. I have considered the risks of abuse, dependence, addiction and diversion and I believe that it is clinically appropriate for Rajni Casiano to be prescribed this medication    Is the patient prescribed a combination of a benzodiazepine and opioid?  No    Last Urine Drug Screen / ordered today: Yes  No results found for this or any previous visit (from the past 8760 hours).  N/A    Controlled Substance Agreement:  Date of the Last Agreement: 1/20/25  Reviewed Controlled Substance Agreement including but not limited to the benefits, risks, and alternatives to treatment with a Controlled Substance medication(s).    Stimulants:   What is the patient's goal of therapy? Improved sleepiness  Is this being achieved with current treatment? N/a - resuming therapy      FU in 3 months (q 3 if virtual, q6 after in person, at least 1 in person per year)        BRYANT Rodriguez

## 2025-01-20 NOTE — PATIENT INSTRUCTIONS
CPAP/BiPAP  Your Positive Airway Pressure (CPAP/BiPAP) settings are adequately controlling your Apnea, please continue to use nightly.   An updated supply order has been sent to the supply company.     As a general guideline, please replace your:   -PAP cushions every 2-4 weeks  -mask every 3-6 months  -hose every 3-6 months  -Filter (disposable) every 2-4 weeks  -machine may need replacement in 5-10 years (once they stop working).     If you have any issues with supplies or need help troubleshooting your machine, please call your DME company.  [employer] - [phone]

## 2025-01-20 NOTE — ASSESSMENT & PLAN NOTE
Pt had break in therapy due to external life stressors. Is now looking to get back on methylphenidate.   Not filled since June 2024.   OARRS reviewed, no variances  UDS ordered (last done 11/2023)  CSA needs updated, will send to patient for signature  Discussed controlled substances requirements with patient. When UDS is done, will send refills of methylphenidate. Needs at least 1 follow up in person per year.

## 2025-01-20 NOTE — ASSESSMENT & PLAN NOTE
C/o stuffiness with PAP use. To call [facility] to help her walk through her settings to adjust her humidity on device.

## 2025-01-28 LAB
CHOLEST SERPL-MCNC: 157 MG/DL
CHOLEST/HDLC SERPL: 4.1 (CALC)
EST. AVERAGE GLUCOSE BLD GHB EST-MCNC: NORMAL MG/DL
EST. AVERAGE GLUCOSE BLD GHB EST-SCNC: NORMAL MMOL/L
HBA1C MFR BLD: NORMAL %
HDLC SERPL-MCNC: 38 MG/DL
LDLC SERPL CALC-MCNC: 93 MG/DL (CALC)
NONHDLC SERPL-MCNC: 119 MG/DL (CALC)
TRIGL SERPL-MCNC: 161 MG/DL

## 2025-01-30 DIAGNOSIS — Z51.81 THERAPEUTIC DRUG MONITORING: Primary | ICD-10-CM

## 2025-02-03 DIAGNOSIS — G47.10 HYPERSOMNIA: Primary | ICD-10-CM

## 2025-02-04 LAB
CHOLEST SERPL-MCNC: 157 MG/DL
CHOLEST/HDLC SERPL: 4.1 (CALC)
HBA1C MFR BLD: NORMAL % OF TOTAL HGB
HDLC SERPL-MCNC: 38 MG/DL
LDLC SERPL CALC-MCNC: 93 MG/DL (CALC)
NONHDLC SERPL-MCNC: 119 MG/DL (CALC)
TRIGL SERPL-MCNC: 161 MG/DL

## 2025-02-12 ENCOUNTER — APPOINTMENT (OUTPATIENT)
Dept: PRIMARY CARE | Facility: CLINIC | Age: 65
End: 2025-02-12
Payer: MEDICARE

## 2025-02-12 VITALS
SYSTOLIC BLOOD PRESSURE: 132 MMHG | DIASTOLIC BLOOD PRESSURE: 68 MMHG | HEART RATE: 68 BPM | RESPIRATION RATE: 14 BRPM | WEIGHT: 217 LBS | HEIGHT: 63 IN | BODY MASS INDEX: 38.45 KG/M2

## 2025-02-12 DIAGNOSIS — Z00.00 MEDICARE ANNUAL WELLNESS VISIT, SUBSEQUENT: ICD-10-CM

## 2025-02-12 DIAGNOSIS — Z12.31 ENCOUNTER FOR SCREENING MAMMOGRAM FOR MALIGNANT NEOPLASM OF BREAST: ICD-10-CM

## 2025-02-12 DIAGNOSIS — E06.3 HYPOTHYROIDISM DUE TO HASHIMOTO'S THYROIDITIS: ICD-10-CM

## 2025-02-12 DIAGNOSIS — I10 PRIMARY HYPERTENSION: ICD-10-CM

## 2025-02-12 DIAGNOSIS — E66.01 MORBID OBESITY (MULTI): ICD-10-CM

## 2025-02-12 DIAGNOSIS — B37.49 CANDIDIASIS, UROGENITAL: ICD-10-CM

## 2025-02-12 DIAGNOSIS — E10.42 TYPE 1 DIABETES MELLITUS WITH DIABETIC POLYNEUROPATHY: ICD-10-CM

## 2025-02-12 DIAGNOSIS — E78.2 MIXED HYPERLIPIDEMIA: Primary | ICD-10-CM

## 2025-02-12 DIAGNOSIS — F32.1 DEPRESSION, MAJOR, SINGLE EPISODE, MODERATE (MULTI): ICD-10-CM

## 2025-02-12 PROBLEM — E78.5 HYPERLIPIDEMIA: Status: RESOLVED | Noted: 2023-06-01 | Resolved: 2025-02-12

## 2025-02-12 PROBLEM — B37.31 VAGINA, CANDIDIASIS: Status: RESOLVED | Noted: 2024-07-22 | Resolved: 2025-02-12

## 2025-02-12 PROBLEM — G47.10 HYPERSOMNIA: Status: RESOLVED | Noted: 2023-06-01 | Resolved: 2025-02-12

## 2025-02-12 PROBLEM — R05.9 COUGH: Status: RESOLVED | Noted: 2024-07-22 | Resolved: 2025-02-12

## 2025-02-12 PROBLEM — R32 URINARY INCONTINENCE: Status: RESOLVED | Noted: 2024-07-22 | Resolved: 2025-02-12

## 2025-02-12 PROBLEM — E11.42 POLYNEUROPATHY IN DIABETES (MULTI): Status: RESOLVED | Noted: 2023-06-01 | Resolved: 2025-02-12

## 2025-02-12 PROBLEM — R21 RASH: Status: RESOLVED | Noted: 2024-07-22 | Resolved: 2025-02-12

## 2025-02-12 PROBLEM — N30.00 ACUTE CYSTITIS: Status: RESOLVED | Noted: 2024-07-22 | Resolved: 2025-02-12

## 2025-02-12 PROBLEM — H52.10 MYOPIA: Status: RESOLVED | Noted: 2024-07-22 | Resolved: 2025-02-12

## 2025-02-12 PROBLEM — E11.65 TYPE 2 DIABETES MELLITUS WITH HYPERGLYCEMIA (MULTI): Status: RESOLVED | Noted: 2023-03-08 | Resolved: 2025-02-12

## 2025-02-12 PROBLEM — H26.9 CATARACT: Status: RESOLVED | Noted: 2023-06-01 | Resolved: 2025-02-12

## 2025-02-12 PROBLEM — N39.0 UTI (URINARY TRACT INFECTION): Status: RESOLVED | Noted: 2024-07-22 | Resolved: 2025-02-12

## 2025-02-12 PROBLEM — J34.89 SINUS PRESSURE: Status: RESOLVED | Noted: 2024-07-22 | Resolved: 2025-02-12

## 2025-02-12 PROBLEM — E11.3293 TYPE 2 DIABETES MELLITUS WITH MILD NONPROLIFERATIVE DIABETIC RETINOPATHY WITHOUT MACULAR EDEMA, BILATERAL: Status: RESOLVED | Noted: 2023-06-01 | Resolved: 2025-02-12

## 2025-02-12 PROBLEM — J06.9 ACUTE URI: Status: RESOLVED | Noted: 2024-07-22 | Resolved: 2025-02-12
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
## 2025-02-12 PROBLEM — G47.11 IDIOPATHIC HYPERSOMNIA: Status: RESOLVED | Noted: 2023-06-01 | Resolved: 2025-02-12

## 2025-02-12 PROBLEM — B02.9 SHINGLES: Status: RESOLVED | Noted: 2024-07-22 | Resolved: 2025-02-12

## 2025-02-12 PROBLEM — Z86.39 HISTORY OF HYPERCHOLESTEROLEMIA: Status: RESOLVED | Noted: 2024-07-22 | Resolved: 2025-02-12

## 2025-02-12 PROBLEM — Z86.39 HISTORY OF DIABETES MELLITUS: Status: RESOLVED | Noted: 2024-07-22 | Resolved: 2025-02-12

## 2025-02-12 PROBLEM — J01.90 ACUTE SINUSITIS: Status: RESOLVED | Noted: 2024-07-22 | Resolved: 2025-02-12

## 2025-02-12 PROCEDURE — 1036F TOBACCO NON-USER: CPT | Performed by: FAMILY MEDICINE

## 2025-02-12 PROCEDURE — 4010F ACE/ARB THERAPY RXD/TAKEN: CPT | Performed by: FAMILY MEDICINE

## 2025-02-12 PROCEDURE — 1123F ACP DISCUSS/DSCN MKR DOCD: CPT | Performed by: FAMILY MEDICINE

## 2025-02-12 PROCEDURE — G2211 COMPLEX E/M VISIT ADD ON: HCPCS | Performed by: FAMILY MEDICINE

## 2025-02-12 PROCEDURE — 3008F BODY MASS INDEX DOCD: CPT | Performed by: FAMILY MEDICINE

## 2025-02-12 PROCEDURE — 1160F RVW MEDS BY RX/DR IN RCRD: CPT | Performed by: FAMILY MEDICINE

## 2025-02-12 PROCEDURE — G0439 PPPS, SUBSEQ VISIT: HCPCS | Performed by: FAMILY MEDICINE

## 2025-02-12 PROCEDURE — 3075F SYST BP GE 130 - 139MM HG: CPT | Performed by: FAMILY MEDICINE

## 2025-02-12 PROCEDURE — 1159F MED LIST DOCD IN RCRD: CPT | Performed by: FAMILY MEDICINE

## 2025-02-12 PROCEDURE — 3078F DIAST BP <80 MM HG: CPT | Performed by: FAMILY MEDICINE

## 2025-02-12 PROCEDURE — 1170F FXNL STATUS ASSESSED: CPT | Performed by: FAMILY MEDICINE

## 2025-02-12 PROCEDURE — 99214 OFFICE O/P EST MOD 30 MIN: CPT | Performed by: FAMILY MEDICINE

## 2025-02-12 PROCEDURE — 1157F ADVNC CARE PLAN IN RCRD: CPT | Performed by: FAMILY MEDICINE

## 2025-02-12 RX ORDER — LEVOTHYROXINE SODIUM 125 UG/1
125 TABLET ORAL DAILY
Qty: 90 TABLET | Refills: 1 | Status: SHIPPED | OUTPATIENT
Start: 2025-02-12 | End: 2026-02-12

## 2025-02-12 RX ORDER — FLUCONAZOLE 150 MG/1
150 TABLET ORAL ONCE
Qty: 1 TABLET | Refills: 0 | Status: SHIPPED | OUTPATIENT
Start: 2025-02-12 | End: 2025-02-12

## 2025-02-12 RX ORDER — VALSARTAN 80 MG/1
80 TABLET ORAL DAILY
Qty: 90 TABLET | Refills: 1 | Status: SHIPPED | OUTPATIENT
Start: 2025-02-12 | End: 2026-02-12

## 2025-02-12 RX ORDER — ATORVASTATIN CALCIUM 80 MG/1
80 TABLET, FILM COATED ORAL NIGHTLY
Qty: 90 TABLET | Refills: 1 | Status: SHIPPED | OUTPATIENT
Start: 2025-02-12

## 2025-02-12 ASSESSMENT — ACTIVITIES OF DAILY LIVING (ADL)
GROCERY_SHOPPING: INDEPENDENT
DOING_HOUSEWORK: INDEPENDENT
MANAGING_FINANCES: INDEPENDENT
TAKING_MEDICATION: INDEPENDENT
DRESSING: INDEPENDENT
BATHING: INDEPENDENT

## 2025-02-12 ASSESSMENT — PATIENT HEALTH QUESTIONNAIRE - PHQ9
1. LITTLE INTEREST OR PLEASURE IN DOING THINGS: NOT AT ALL
SUM OF ALL RESPONSES TO PHQ9 QUESTIONS 1 AND 2: 0
2. FEELING DOWN, DEPRESSED OR HOPELESS: NOT AT ALL

## 2025-02-12 NOTE — ASSESSMENT & PLAN NOTE
Recommend decrease in calorie intake, regular aerobic exercise with low fat and low cholesterol diet. Will monitor weight, blood glucose and cholesterol regularly. Recommend  bariatric evaluation and nutritionist evaluation. Pt declined

## 2025-02-12 NOTE — PROGRESS NOTES
"Subjective   Patient ID: Rajni Casiano is a 64 y.o. female who presents for Medicare Annual Wellness Visit Subsequent (fu).    HPI   Patient has been compliant with taking all  current medications. FBG has been at 150's most days. occ hypoglycemic episodes. + polydipsia, polyuria but no  significant weight gain. No lower extremities skin lesion. stable LE numbness and tingling. No blurry vision. No GI upset  or muscle ache while on statin for high lipids. Normal appetite. No CP, HA, dizziness, heart palpitation. No claudication or cold LE.  No LE edema. + imbalance and  falls. Good mood. pt has been having fatigue, cold intolerance while on current dose of  Levoxyl. No dry skin or constipation. No wt changes. Pt sleeps well. Depression was controlled  Review of Systems    Objective   /68   Pulse 68   Resp 14   Ht 1.6 m (5' 3\")   Wt 98.4 kg (217 lb)   BMI 38.44 kg/m²     Physical Exam  NAD, well groomed, No sclera icterus. neck: supple, no cervical or axillary lymphadenopathy,  lungs: CTA b/l, heart: RRR, No LE edema, normal pedal pulses, abd: soft, no tenderness, BS+, normal strength but decreased sensation  at bilateral lower extremities.  fair balance with a walker. No dry skin, CNII-XII were grossly intact, good judgment and memory. No depressed mood.    Assessment/Plan   Assessment & Plan  Mixed hyperlipidemia  Hyperlipidemia on statin. No GI upset or muscle ache. Will monitor labs for evaluation.  Health diet and regular exercise. Decrease calorie intake to lose wt.  f/u in 3 mos.     Orders:    atorvastatin (Lipitor) 80 mg tablet; Take 1 tablet (80 mg) by mouth once daily at bedtime.    Hypothyroidism due to Hashimoto's thyroiditis  Hypothyroidism, asymptomatic. Will monitor TSH for evaluation.   Orders:    levothyroxine (Synthroid, Levoxyl) 125 mcg tablet; Take 1 tablet (125 mcg) by mouth once daily.    Primary hypertension  BP has been controlled. Continue BP pills. keep a daily  bp log and bring " in the log at the next office visit. Call office if BP is persistently over 130/80. DASH diet and regular exercise. Decrease calorie intake to lose wt.      Orders:    valsartan (Diovan) 80 mg tablet; Take 1 tablet (80 mg) by mouth once daily.    Type 1 diabetes mellitus with diabetic polyneuropathy  Partially controlled. Continue current medications. Will monitor A1C, urine albumin. advise eye exam by an OD yearly and checking bilateral feet for skin lesions qhs. Healthy diet and regular exercise. Decrease calorie intake to lose wt.      Orders:    Albumin-Creatinine Ratio, Urine Random; Future    Hemoglobin A1C; Future    Candidiasis, urogenital    Orders:    fluconazole (Diflucan) 150 mg tablet; Take 1 tablet (150 mg) by mouth 1 time for 1 dose.    Medicare annual wellness visit, subsequent  Medicare wellness visit: pt was capable of performing all ADLs and IADLs. Pt has good memory and cognitive function. pt is morbidly obese. Recommend healthy diet and regular exercise. pt declined to see a nutritionist for eval. Advise eye exam by an OD yearly for glaucoma screen and dental exam every 6 months. check lipids.   will monitor blood pressure, cholesterol levels and weight regularly.  Recommend shingle vaccines,  flu,  RSV, and covid shot.   recommend mammogram.  Recommend pt to clear throw rugs at home and keep good lighting at night to avoid falls. Keep hot water for shower below 120F to avoid burn injury.  recommend grab bar at bathtub.   recommend to update living will and DPOA  pt  had been taking all current  medications as prescribed.          Depression, major, single episode, moderate (Multi)  Depression, well controlled with zoloft. No HI/SI. Cont. the same. f/u in 3 mos         Morbid obesity (Multi)  Recommend decrease in calorie intake, regular aerobic exercise with low fat and low cholesterol diet. Will monitor weight, blood glucose and cholesterol regularly. Recommend  bariatric evaluation and  nutritionist evaluation. Pt declined         Encounter for screening mammogram for malignant neoplasm of breast    Orders:    BI mammo bilateral screening tomosynthesis; Future         Patient was identified as a fall risk. Risk prevention instructions provided.

## 2025-02-12 NOTE — ASSESSMENT & PLAN NOTE
Hyperlipidemia on statin. No GI upset or muscle ache. Will monitor labs for evaluation.  Health diet and regular exercise. Decrease calorie intake to lose wt.  f/u in 3 mos.     Orders:    atorvastatin (Lipitor) 80 mg tablet; Take 1 tablet (80 mg) by mouth once daily at bedtime.

## 2025-02-12 NOTE — ASSESSMENT & PLAN NOTE
Orders:    fluconazole (Diflucan) 150 mg tablet; Take 1 tablet (150 mg) by mouth 1 time for 1 dose.

## 2025-02-12 NOTE — ASSESSMENT & PLAN NOTE
Medicare wellness visit: pt was capable of performing all ADLs and IADLs. Pt has good memory and cognitive function. pt is morbidly obese. Recommend healthy diet and regular exercise. pt declined to see a nutritionist for eval. Advise eye exam by an OD yearly for glaucoma screen and dental exam every 6 months. check lipids.   will monitor blood pressure, cholesterol levels and weight regularly.  Recommend shingle vaccines,  flu,  RSV, and covid shot.   recommend mammogram.  Recommend pt to clear throw rugs at home and keep good lighting at night to avoid falls. Keep hot water for shower below 120F to avoid burn injury.  recommend grab bar at bathtub.   recommend to update living will and DPOA  pt  had been taking all current  medications as prescribed.

## 2025-02-12 NOTE — ASSESSMENT & PLAN NOTE
Partially controlled. Continue current medications. Will monitor A1C, urine albumin. advise eye exam by an OD yearly and checking bilateral feet for skin lesions qhs. Healthy diet and regular exercise. Decrease calorie intake to lose wt.      Orders:    Albumin-Creatinine Ratio, Urine Random; Future    Hemoglobin A1C; Future

## 2025-02-25 LAB
ALBUMIN/CREAT UR: 15 MG/G CREAT
AMPHETAMINES UR QL: NEGATIVE NG/ML
BARBITURATES UR QL: NEGATIVE NG/ML
BENZODIAZ UR QL: NEGATIVE NG/ML
BZE UR QL: NEGATIVE NG/ML
CREAT UR-MCNC: 86.6 MG/DL
CREAT UR-MCNC: 88 MG/DL (ref 20–275)
EST. AVERAGE GLUCOSE BLD GHB EST-MCNC: 289 MG/DL
EST. AVERAGE GLUCOSE BLD GHB EST-SCNC: 16 MMOL/L
HBA1C MFR BLD: 11.7 % OF TOTAL HGB
METHADONE UR QL: NEGATIVE NG/ML
MICROALBUMIN UR-MCNC: 1.3 MG/DL
OPIATES UR QL: NEGATIVE NG/ML
OXIDANTS UR QL: NEGATIVE MCG/ML
OXYCODONE UR QL: NEGATIVE NG/ML
PCP UR QL: NEGATIVE NG/ML
PH UR: 5.4 [PH] (ref 4.5–9)
QUEST NOTES AND COMMENTS: NORMAL
THC UR QL: NEGATIVE NG/ML

## 2025-03-03 ENCOUNTER — TELEPHONE (OUTPATIENT)
Dept: NEUROLOGY | Facility: HOSPITAL | Age: 65
End: 2025-03-03
Payer: MEDICARE

## 2025-03-03 NOTE — TELEPHONE ENCOUNTER
Pt requests refill of Methylphenidate 20 mg bid please be sent to [facility] [address] please.  Thank you.    RAJENDRA 1/20/25  NV 4/22/25

## 2025-03-10 ENCOUNTER — TELEPHONE (OUTPATIENT)
Dept: NEUROLOGY | Facility: HOSPITAL | Age: 65
End: 2025-03-10
Payer: MEDICARE

## 2025-03-10 DIAGNOSIS — G47.11 IDIOPATHIC HYPERSOMNIA: ICD-10-CM

## 2025-03-10 DIAGNOSIS — G47.419 PRIMARY NARCOLEPSY WITHOUT CATAPLEXY (HHS-HCC): ICD-10-CM

## 2025-03-10 RX ORDER — METHYLPHENIDATE HYDROCHLORIDE 20 MG/1
20 TABLET ORAL 2 TIMES DAILY
Qty: 60 TABLET | Refills: 0 | Status: SHIPPED | OUTPATIENT
Start: 2025-03-10 | End: 2025-04-09

## 2025-03-10 RX ORDER — METHYLPHENIDATE HYDROCHLORIDE 20 MG/1
20 TABLET ORAL 2 TIMES DAILY
Qty: 60 TABLET | Refills: 0 | Status: SHIPPED | OUTPATIENT
Start: 2025-04-09 | End: 2025-05-09

## 2025-03-10 NOTE — TELEPHONE ENCOUNTER
Pt requests refill of Methylphenidate 20 mg bid please to [facility].      Pt called on 3/3 and I accidentally didn't forward the message.    Thanks Cadence.

## 2025-04-22 ENCOUNTER — TELEMEDICINE (OUTPATIENT)
Dept: NEUROLOGY | Facility: HOSPITAL | Age: 65
End: 2025-04-22
Payer: MEDICARE

## 2025-04-22 DIAGNOSIS — G47.30 HYPERSOMNIA WITH SLEEP APNEA: ICD-10-CM

## 2025-04-22 DIAGNOSIS — G25.81 RESTLESS LEGS SYNDROME: ICD-10-CM

## 2025-04-22 DIAGNOSIS — G47.10 HYPERSOMNIA WITH SLEEP APNEA: ICD-10-CM

## 2025-04-22 DIAGNOSIS — G47.33 OBSTRUCTIVE SLEEP APNEA: Primary | ICD-10-CM

## 2025-04-22 RX ORDER — METHYLPHENIDATE HYDROCHLORIDE 20 MG/1
20 TABLET ORAL 2 TIMES DAILY
Qty: 60 TABLET | Refills: 0 | Status: SHIPPED | OUTPATIENT
Start: 2025-06-08 | End: 2025-07-08

## 2025-04-22 RX ORDER — METHYLPHENIDATE HYDROCHLORIDE 20 MG/1
20 TABLET ORAL 2 TIMES DAILY
Qty: 60 TABLET | Refills: 0 | Status: SHIPPED | OUTPATIENT
Start: 2025-05-09 | End: 2025-06-08

## 2025-04-22 RX ORDER — METHYLPHENIDATE HYDROCHLORIDE 20 MG/1
20 TABLET ORAL 2 TIMES DAILY
Qty: 60 TABLET | Refills: 0 | Status: SHIPPED | OUTPATIENT
Start: 2025-07-08 | End: 2025-08-07

## 2025-04-22 NOTE — PROGRESS NOTES
[facility]    SUBJECTIVE    Rajni Casiano is a 64 y.o. right-handed female who presents with   Chief Complaint   Patient presents with    Follow-up        Presents for follow up visit  Visit type: virtual visit Virtual or Telephone Consent    An interactive audio and video telecommunication system which permits real time communications between the patient (at the originating site) and provider (at the distant site) was utilized to provide this telehealth service.   Verbal consent was requested and obtained from Rajni Casiano on this date, 04/23/25 for a telehealth visit and the patient's location was confirmed at the time of the visit.    HISTORY OF PRESENT ILLNESS    RECAP:  06/03/24 - CPAP DL reviewed. Pt is symptomatically benefiting from CPAP use.      Ran out of medication. Doing pretty well on the increase of methylphenidate.      Gabapentin working well for RLS symptoms.      Has two CPAP machines, when camping uses the other machine and staying at mom's. Was staying at her mother's every night. Currently back home now because mother is in hospital, she is not doing well.     01/20/25 -     ESS 14/24     Nose stuffy recently with PAP when she is waking up in the morning.     Pt had break in therapy due to external life stressors. Is now looking to get back on methylphenidate.   Not filled since June 2024.   OARRS reviewed, no variances  UDS ordered (last done 11/2023)  CSA needs updated, will send to patient for signature  Discussed controlled substances requirements with patient. When UDS is done, will send refills of methylphenidate. Needs at least 1 follow up in person per year.    04/22/25- presents on call alone today. Her young grandson is in the room.     Thinks her sleepiness is a little better now back on medication.     EPWORTH SLEEPINESS SCALE   Sitting and reading 2   Watching television 2   Sitting inactive in a public place 0   As passenger in car for an hour or more  "without a break 3   Lying down to rest in the afternoon when circumstances permit 3   Sitting and talking to someone 0   Sitting quietly after a lunch without alcohol 2   In a car while stopped for a few minutes in traffic 0   TOTAL 12/24     Methylphenidate 20 mg BID, occasionally will skip second dose as she is \"wide awake.\" Mornings are the worst time.     [facility] is DME. Still using travel PAP often. No PAP DL data available on her issued device.     Asking about [facility]    Has 4 year old and 2 year old great grandchildren that she has custody of. Their mother overdosed end of last year.     REVIEW OF SYSTEMS:  10 point review of systems performed and is negative except as noted in the HPI.     Medical History[1]    Family medical history includes stroke in father.    Surgical History[2]    Social History     Substance and Sexual Activity   Drug Use Never     Tobacco Use: High Risk (4/22/2025)    Received from [facility]    Patient History     Smoking Tobacco Use: Every Day     Smokeless Tobacco Use: Former     Passive Exposure: Not on file     Alcohol Use: Not on file       Current Outpatient Medications   Medication Instructions    acetaminophen (Tylenol) 500 mg tablet 1 tablet, Every 6 hours PRN    atorvastatin (LIPITOR) 80 mg, oral, Nightly    BD Insulin Syringe Ultra-Fine 0.5 mL 31 gauge x 5/16\" syringe USE THREE TIMES A DAY    gabapentin (NEURONTIN) 300 mg, oral, Nightly     mg tablet 1 tablet, Every 6 hours PRN    insulin regular (HumuLIN R U-500) 500 unit/mL CONCENTRATED injection .25 mL brkfst, .25 mL lunch, .10 mL dinner Subcutaneous with meals for 90 days    levothyroxine (SYNTHROID, LEVOXYL) 125 mcg, oral, Daily    [START ON 7/8/2025] methylphenidate (RITALIN) 20 mg, oral, 2 times daily    [START ON 6/8/2025] methylphenidate (RITALIN) 20 mg, oral, 2 times daily    [START ON 5/9/2025] methylphenidate (RITALIN) 20 mg, oral, 2 times daily    metoprolol tartrate (LOPRESSOR) 25 mg, oral, 2 " times daily    sertraline (ZOLOFT) 100 mg, oral, Nightly    valsartan (DIOVAN) 80 mg, oral, Daily         OBJECTIVE    There were no vitals taken for this visit.      Physical Exam  Eyes:      General: Lids are normal.      Extraocular Movements: Extraocular movements intact.   Psychiatric:         Speech: Speech normal.       Neurological Exam  Mental Status  Awake, alert and oriented to person, place and time. Speech is normal. Language is fluent with no aphasia. Attention and concentration are normal. Fund of knowledge is appropriate for level of education.    Cranial Nerves  CN III, IV, VI: Extraocular movements intact bilaterally. Normal lids and orbits bilaterally.  CN VII: Full and symmetric facial movement.  CN VIII: Hearing is normal.    Motor   No abnormal involuntary movements.        No results found for this or any previous visit from the past 1825 days.      Lab Results   Component Value Date    WBC 6.0 11/22/2024    RBC 4.68 11/22/2024    HGB 14.3 11/22/2024    HCT 44.3 11/22/2024     11/22/2024     (L) 11/22/2024    K 4.5 11/22/2024    CL 98 11/22/2024    BUN 19 11/22/2024    CREATININE 1.04 11/22/2024    EGFR 59 (L) 11/22/2024    CALCIUM 9.2 11/22/2024    ALKPHOS 99 03/12/2024    AST 28 03/12/2024    ALT 27 03/12/2024    VITD25 22 (A) 07/17/2018    HGBA1C 11.7 (H) 02/24/2025    LDLCALC 93 01/27/2025    CHOL 157 01/27/2025    HDL 38 (L) 01/27/2025    TRIG 161 (H) 01/27/2025    TSH 2.15 11/22/2024          ASSESSMENT & PLAN  Problem List Items Addressed This Visit       Hypersomnia with sleep apnea    Overview   Has prior narcolepsy diagnosis but no supporting MSLT  Sleep attacks controlled on stimulant but still sleepy overall.   No cataplexy.   Failed wakix (side effects) and vyvanse (no help per pt)  S/p adderall 30 mg am and 10 mg early afternoon (changed to ritalin due to shortage)  On methylphenidate 10 mg BID. Still very sleepy.          Relevant Medications    methylphenidate  (Ritalin) 20 mg tablet (Start on 7/8/2025)    methylphenidate (Ritalin) 20 mg tablet (Start on 6/8/2025)    methylphenidate (Ritalin) 20 mg tablet (Start on 5/9/2025)    Restless legs syndrome    Overview   Well controlled on gabapentin 300 mg at night         Obstructive sleep apnea - Primary    Overview   DME is [facility]  ResMed device  AutoCPAP 6-20 cm H2O  Has 2nd CPAP for traveling          OARRS:  Cadence López, APRN-CNP on 4/22/2025  8:49 AM  I have personally reviewed the OARRS report for Rajni Casiano. I have considered the risks of abuse, dependence, addiction and diversion and I believe that it is clinically appropriate for Rajni Casiano to be prescribed this medication    Is the patient prescribed a combination of a benzodiazepine and opioid?  No    Last Urine Drug Screen / ordered today: No  Recent Results (from the past 8760 hours)   Drug Screen, Urine With Reflex to Confirmation    Collection Time: 02/24/25 12:12 PM   Result Value Ref Range    Amphetamines NEGATIVE <500 ng/mL    Barbiturates NEGATIVE <300 ng/mL    Benzodiazepines NEGATIVE <100 ng/mL    Cocaine Metabolite NEGATIVE <150 ng/mL    Marijuana Metabolite NEGATIVE <20 ng/mL    Methadone Metabolite NEGATIVE <100 ng/mL    Opiates NEGATIVE <100 ng/mL    Oxycodone NEGATIVE <100 ng/mL    Phencyclidine NEGATIVE <25 ng/mL    Creatinine 86.6 > or = 20.0 mg/dL    pH 5.4 4.5 - 9.0    Oxidant NEGATIVE <200 mcg/mL    Notes and Comments       Results are as expected.     Controlled Substance Agreement:  Date of the Last Agreement: 1/20/25 -- sent out, not yet signed by patient, resent via "[employer] today  Reviewed Controlled Substance Agreement including but not limited to the benefits, risks, and alternatives to treatment with a Controlled Substance medication(s).    Stimulants:   What is the patient's goal of therapy? Improved daytime sleepiness  Is this being achieved with current treatment? yes    Activities of Daily Living:   Is your overall impression  that this patient is benefiting (symptom reduction outweighs side effects) from stimulant therapy? Yes     1. Physical Functioning: Better  2. Family Relationship: Better  3. Social Relationship: Better  4. Mood: Better  5. Sleep Patterns: Better  6. Overall Function: Better    FU in 3 months in person        Cadence López, JASON-ALTHEA           [1]   Past Medical History:  Diagnosis Date    Anxiety     Arthritis     Bell palsy     Cataract     CTS (carpal tunnel syndrome)     Difficulty walking     Disease of thyroid gland     Heart disease     Hypertension     Narcolepsy without cataplexy ([facility])     Narcolepsy    Neuropathy in diabetes (Multi)     Numbness     Obstructive sleep apnea (adult) (pediatric)     Obstructive sleep apnea    Personal history of other diseases of the circulatory system     History of hypertension    Personal history of other endocrine, nutritional and metabolic disease     History of hypercholesterolemia    Personal history of other endocrine, nutritional and metabolic disease     History of hypothyroidism    Personal history of other endocrine, nutritional and metabolic disease     History of diabetes mellitus    Restless leg syndrome    [2]   Past Surgical History:  Procedure Laterality Date    CAROTID ENDARTERECTOMY      CARPAL TUNNEL RELEASE      CATARACT EXTRACTION  07/03/2013    Cataract Surgery    CORONARY ARTERY BYPASS GRAFT  01/12/2017    CABG    HYSTERECTOMY  07/03/2013    Hysterectomy    NECK SURGERY  07/03/2013    Neck Surgery

## 2025-04-23 PROBLEM — G47.30 HYPERSOMNIA WITH SLEEP APNEA: Status: ACTIVE | Noted: 2023-06-01

## 2025-05-12 ENCOUNTER — APPOINTMENT (OUTPATIENT)
Dept: PRIMARY CARE | Facility: CLINIC | Age: 65
End: 2025-05-12
Payer: COMMERCIAL

## 2025-05-23 ENCOUNTER — TELEPHONE (OUTPATIENT)
Dept: PRIMARY CARE | Facility: CLINIC | Age: 65
End: 2025-05-23
Payer: MEDICARE

## 2025-05-23 NOTE — TELEPHONE ENCOUNTER
Pt stated she has been sick all week with cough and congestion. She would like to know what OTC medication you recommend for those symptoms?

## 2025-05-25 ENCOUNTER — APPOINTMENT (OUTPATIENT)
Dept: RADIOLOGY | Facility: HOSPITAL | Age: 65
End: 2025-05-25
Payer: MEDICARE

## 2025-05-25 ENCOUNTER — HOSPITAL ENCOUNTER (EMERGENCY)
Facility: HOSPITAL | Age: 65
Discharge: HOME | End: 2025-05-25
Attending: EMERGENCY MEDICINE
Payer: MEDICARE

## 2025-05-25 VITALS
WEIGHT: 220 LBS | TEMPERATURE: 97.7 F | HEIGHT: 62 IN | DIASTOLIC BLOOD PRESSURE: 74 MMHG | HEART RATE: 71 BPM | OXYGEN SATURATION: 98 % | RESPIRATION RATE: 19 BRPM | BODY MASS INDEX: 40.48 KG/M2 | SYSTOLIC BLOOD PRESSURE: 132 MMHG

## 2025-05-25 DIAGNOSIS — J20.9 ACUTE BRONCHITIS, UNSPECIFIED ORGANISM: Primary | ICD-10-CM

## 2025-05-25 LAB
ANION GAP SERPL CALC-SCNC: 10 MMOL/L (ref 10–20)
BASOPHILS # BLD AUTO: 0.05 X10*3/UL (ref 0–0.1)
BASOPHILS NFR BLD AUTO: 0.6 %
BNP SERPL-MCNC: 141 PG/ML (ref 0–99)
BUN SERPL-MCNC: 20 MG/DL (ref 6–23)
CALCIUM SERPL-MCNC: 9.4 MG/DL (ref 8.6–10.3)
CHLORIDE SERPL-SCNC: 99 MMOL/L (ref 98–107)
CO2 SERPL-SCNC: 29 MMOL/L (ref 21–32)
CREAT SERPL-MCNC: 0.99 MG/DL (ref 0.5–1.05)
EGFRCR SERPLBLD CKD-EPI 2021: 63 ML/MIN/1.73M*2
EOSINOPHIL # BLD AUTO: 0.29 X10*3/UL (ref 0–0.7)
EOSINOPHIL NFR BLD AUTO: 3.4 %
ERYTHROCYTE [DISTWIDTH] IN BLOOD BY AUTOMATED COUNT: 12.4 % (ref 11.5–14.5)
GLUCOSE SERPL-MCNC: 350 MG/DL (ref 74–99)
HCT VFR BLD AUTO: 41.6 % (ref 36–46)
HGB BLD-MCNC: 13.6 G/DL (ref 12–16)
IMM GRANULOCYTES # BLD AUTO: 0.02 X10*3/UL (ref 0–0.7)
IMM GRANULOCYTES NFR BLD AUTO: 0.2 % (ref 0–0.9)
LYMPHOCYTES # BLD AUTO: 1.24 X10*3/UL (ref 1.2–4.8)
LYMPHOCYTES NFR BLD AUTO: 14.5 %
MCH RBC QN AUTO: 30 PG (ref 26–34)
MCHC RBC AUTO-ENTMCNC: 32.7 G/DL (ref 32–36)
MCV RBC AUTO: 92 FL (ref 80–100)
MONOCYTES # BLD AUTO: 0.54 X10*3/UL (ref 0.1–1)
MONOCYTES NFR BLD AUTO: 6.3 %
NEUTROPHILS # BLD AUTO: 6.39 X10*3/UL (ref 1.2–7.7)
NEUTROPHILS NFR BLD AUTO: 75 %
NRBC BLD-RTO: 0 /100 WBCS (ref 0–0)
PLATELET # BLD AUTO: 196 X10*3/UL (ref 150–450)
POTASSIUM SERPL-SCNC: 4.2 MMOL/L (ref 3.5–5.3)
RBC # BLD AUTO: 4.54 X10*6/UL (ref 4–5.2)
SODIUM SERPL-SCNC: 134 MMOL/L (ref 136–145)
WBC # BLD AUTO: 8.5 X10*3/UL (ref 4.4–11.3)

## 2025-05-25 PROCEDURE — 85025 COMPLETE CBC W/AUTO DIFF WBC: CPT | Performed by: EMERGENCY MEDICINE

## 2025-05-25 PROCEDURE — 2500000002 HC RX 250 W HCPCS SELF ADMINISTERED DRUGS (ALT 637 FOR MEDICARE OP, ALT 636 FOR OP/ED): Performed by: EMERGENCY MEDICINE

## 2025-05-25 PROCEDURE — 9420000001 HC RT PATIENT EDUCATION 5 MIN

## 2025-05-25 PROCEDURE — 71045 X-RAY EXAM CHEST 1 VIEW: CPT

## 2025-05-25 PROCEDURE — 94664 DEMO&/EVAL PT USE INHALER: CPT

## 2025-05-25 PROCEDURE — 36415 COLL VENOUS BLD VENIPUNCTURE: CPT | Performed by: EMERGENCY MEDICINE

## 2025-05-25 PROCEDURE — 71045 X-RAY EXAM CHEST 1 VIEW: CPT | Mod: FOREIGN READ | Performed by: RADIOLOGY

## 2025-05-25 PROCEDURE — 99284 EMERGENCY DEPT VISIT MOD MDM: CPT | Performed by: EMERGENCY MEDICINE

## 2025-05-25 PROCEDURE — 94640 AIRWAY INHALATION TREATMENT: CPT | Mod: MUEWO

## 2025-05-25 PROCEDURE — 83880 ASSAY OF NATRIURETIC PEPTIDE: CPT | Performed by: EMERGENCY MEDICINE

## 2025-05-25 PROCEDURE — 94640 AIRWAY INHALATION TREATMENT: CPT

## 2025-05-25 PROCEDURE — 80048 BASIC METABOLIC PNL TOTAL CA: CPT | Performed by: EMERGENCY MEDICINE

## 2025-05-25 RX ORDER — AMOXICILLIN AND CLAVULANATE POTASSIUM 875; 125 MG/1; MG/1
1 TABLET, FILM COATED ORAL EVERY 12 HOURS
Qty: 14 TABLET | Refills: 0 | Status: SHIPPED | OUTPATIENT
Start: 2025-05-25 | End: 2025-06-01

## 2025-05-25 RX ORDER — BENZONATATE 100 MG/1
200 CAPSULE ORAL EVERY 8 HOURS
Qty: 30 CAPSULE | Refills: 0 | Status: SHIPPED | OUTPATIENT
Start: 2025-05-25 | End: 2025-05-30

## 2025-05-25 RX ORDER — FLUCONAZOLE 150 MG/1
150 TABLET ORAL ONCE
Qty: 1 TABLET | Refills: 0 | Status: SHIPPED | OUTPATIENT
Start: 2025-05-25 | End: 2025-05-25

## 2025-05-25 RX ORDER — AMOXICILLIN AND CLAVULANATE POTASSIUM 875; 125 MG/1; MG/1
1 TABLET, FILM COATED ORAL EVERY 12 HOURS
Qty: 14 TABLET | Refills: 0 | Status: SHIPPED | OUTPATIENT
Start: 2025-05-25 | End: 2025-05-25

## 2025-05-25 RX ORDER — IPRATROPIUM BROMIDE AND ALBUTEROL SULFATE 2.5; .5 MG/3ML; MG/3ML
3 SOLUTION RESPIRATORY (INHALATION) ONCE
Status: COMPLETED | OUTPATIENT
Start: 2025-05-25 | End: 2025-05-25

## 2025-05-25 RX ORDER — BENZONATATE 100 MG/1
200 CAPSULE ORAL EVERY 8 HOURS
Qty: 30 CAPSULE | Refills: 0 | Status: SHIPPED | OUTPATIENT
Start: 2025-05-25 | End: 2025-05-25

## 2025-05-25 RX ADMIN — IPRATROPIUM BROMIDE AND ALBUTEROL SULFATE 3 ML: 2.5; .5 SOLUTION RESPIRATORY (INHALATION) at 14:15

## 2025-05-25 ASSESSMENT — PAIN - FUNCTIONAL ASSESSMENT
PAIN_FUNCTIONAL_ASSESSMENT: 0-10
PAIN_FUNCTIONAL_ASSESSMENT: 0-10

## 2025-05-25 ASSESSMENT — PAIN SCALES - GENERAL
PAINLEVEL_OUTOF10: 0 - NO PAIN

## 2025-05-25 NOTE — ED PROVIDER NOTES
"      Emergency Department         [facility]   ED  Provider Note  5/25/2025  1:14 PM  AC11/AC11      Chief Complaint   Patient presents with    Cough        History of Present Illness:   Rajni Casiano is a 64 y.o. female presenting to the ED for cough for 1 week,.  The complaint has been persistent, moderate in severity, and worsened by nothing.  Patient occasionally coughs so hard she feels short of breath.  She denies fever or chills.  She denies sputum production.  She denies loss of sense of smell fever or chills.  She has no history of seasonal allergies.  She denies recent change in medication.      Review of Systems:   Pertinent positives and review of systems as noted above.  Remaining 10 review of systems is negative or noncontributory to today's episode of care.  Review of Systems       --------------------------------------------- PAST HISTORY ---------------------------------------------  Past Medical History: Medical History[1]     Past Surgical History: Surgical History[2]     Social History:   Social History     Social History Narrative    Not on file        Family History: family history includes Breast cancer in her mother; Cancer in her mother; Diabetes in her brother, father, and father's sister; Diabetes type II in her father; Heart disease in her father and mother; Hypertension in her father and mother; Stroke in her father. Unless otherwise noted, family history is non contributory    Patient's Medications   New Prescriptions    No medications on file   Previous Medications    ACETAMINOPHEN (TYLENOL) 500 MG TABLET    Take 1 tablet (500 mg) by mouth every 6 hours if needed for pain.    ATORVASTATIN (LIPITOR) 80 MG TABLET    Take 1 tablet (80 mg) by mouth once daily at bedtime.    BD INSULIN SYRINGE ULTRA-FINE 0.5 ML 31 GAUGE X 5/16\" SYRINGE    USE THREE TIMES A DAY    GABAPENTIN (NEURONTIN) 300 MG CAPSULE    Take 1 capsule (300 mg) by mouth once daily at bedtime.     MG TABLET    Take 1 " tablet (600 mg) by mouth every 6 hours if needed for pain.    INSULIN REGULAR (HUMULIN R U-500) 500 UNIT/ML CONCENTRATED INJECTION    .25 mL brkfst, .25 mL lunch, .10 mL dinner Subcutaneous with meals for 90 days    LEVOTHYROXINE (SYNTHROID, LEVOXYL) 125 MCG TABLET    Take 1 tablet (125 mcg) by mouth once daily.    METHYLPHENIDATE (RITALIN) 20 MG TABLET    Take 1 tablet (20 mg) by mouth 2 times a day. Do not fill before July 8, 2025.    METHYLPHENIDATE (RITALIN) 20 MG TABLET    Take 1 tablet (20 mg) by mouth 2 times a day. Do not fill before June 8, 2025.    METHYLPHENIDATE (RITALIN) 20 MG TABLET    Take 1 tablet (20 mg) by mouth 2 times a day. Do not fill before May 9, 2025.    METOPROLOL TARTRATE (LOPRESSOR) 25 MG TABLET    Take 1 tablet (25 mg) by mouth 2 times a day.    SERTRALINE (ZOLOFT) 100 MG TABLET    Take 1 tablet (100 mg) by mouth once daily at bedtime.    VALSARTAN (DIOVAN) 80 MG TABLET    Take 1 tablet (80 mg) by mouth once daily.   Modified Medications    No medications on file   Discontinued Medications    No medications on file      The patient’s home medications have been reviewed.    Allergies: Clarithromycin and Modafinil    -------------------------------------------------- RESULTS -------------------------------------------------  All laboratory and radiology results have been personally reviewed by myself   LABS:  Labs Reviewed   BASIC METABOLIC PANEL - Abnormal       Result Value    Glucose 350 (*)     Sodium 134 (*)     Potassium 4.2      Chloride 99      Bicarbonate 29      Anion Gap 10      Urea Nitrogen 20      Creatinine 0.99      eGFR 63      Calcium 9.4     B-TYPE NATRIURETIC PEPTIDE - Abnormal     (*)     Narrative:        <100 pg/mL - Heart failure unlikely  100-299 pg/mL - Intermediate probability of acute heart                  failure exacerbation. Correlate with clinical                  context and patient history.    >=300 pg/mL - Heart Failure likely. Correlate with  "clinical                  context and patient history.    BNP testing is performed using different testing methodology at [facility] than at other [facility] hospitals. Direct result comparisons should only be made within the same method.      CBC WITH AUTO DIFFERENTIAL    WBC 8.5      nRBC 0.0      RBC 4.54      Hemoglobin 13.6      Hematocrit 41.6      MCV 92      MCH 30.0      MCHC 32.7      RDW 12.4      Platelets 196      Neutrophils % 75.0      Immature Granulocytes %, Automated 0.2      Lymphocytes % 14.5      Monocytes % 6.3      Eosinophils % 3.4      Basophils % 0.6      Neutrophils Absolute 6.39      Immature Granulocytes Absolute, Automated 0.02      Lymphocytes Absolute 1.24      Monocytes Absolute 0.54      Eosinophils Absolute 0.29      Basophils Absolute 0.05           RADIOLOGY:  Interpreted by Radiologist.  XR chest 1 view   Final Result   1.Normal heart size with poststernotomy changes.   2.Mild bibasilar subsegmental atelectasis or scarring. No   pulmonary edema.   Signed by Taylor Radford, DO          No results found for this or any previous visit (from the past 4464 hours).  ------------------------- NURSING NOTES AND VITALS REVIEWED ---------------------------   The nursing notes within the ED encounter and vital signs as below have been reviewed.   /62 (BP Location: Right arm, Patient Position: Sitting)   Pulse 72   Temp 36.5 °C (97.7 °F) (Temporal)   Resp 18   Ht 1.575 m (5' 2\")   Wt 99.8 kg (220 lb)   SpO2 95%   BMI 40.24 kg/m²   Oxygen Saturation Interpretation: Normal      ---------------------------------------------------PHYSICAL EXAM--------------------------------------  Physical Exam   Constitutional/General: Alert,  well appearing, non toxic in NAD  Head: Normocephalic and atraumatic  Eyes: PERRL, EOMI, conjunctiva normal, sclera non icteric  Mouth: Oropharynx clear, handling secretions, no trismus, no asymmetry of the posterior oropharynx or uvular " edema  Neck: Supple, full ROM, non tender to palpation in the midline, no stridor, no crepitus, no meningeal signs  Respiratory: Lungs clear to auscultation bilaterally, no wheezes, rales, or rhonchi. Not in respiratory distress  Cardiovascular:  Regular rate. Regular rhythm. No murmurs, gallops, or rubs. 2+ distal pulses  Chest: No chest wall tenderness  GI:  Abdomen Soft, Non tender, Non distended.  +BS. No organomegaly, no palpable masses,  No rebound, guarding, or rigidity.   Musculoskeletal: Moves all extremities x 4. Warm and well perfused, no clubbing, cyanosis, or edema. Capillary refill <3 seconds  Integument: skin warm and dry. No rashes.   Lymphatic: no lymphadenopathy noted  Neurologic: No focal deficits, symmetric strength 5/5 in the upper and lower extremities bilaterally  Psychiatric: Normal Affect    Procedures    ------------------------------ ED COURSE/MEDICAL DECISION MAKING----------------------  Diagnoses as of 05/25/25 1532   Acute bronchitis, unspecified organism      Patient has a nonproductive cough for the past week.  She has not had fever or chills.  She does not have sore throat.  She does not have loss of sense of smell.  She does not feel short of breath except when coughing.  The patient be treated with Augmentin and Tessalon Perles advised follow-up with primary care doctor in 3 to 5 days.    Differential Diagnosis: Pneumonia, bronchitis, viral URI    Medical Decision Making:   Discharge to home  Diagnoses as of 05/25/25 1532   Acute bronchitis, unspecified organism        Counseling:   The emergency provider has spoken with the patient and discussed today’s results, in addition to providing specific details for the plan of care and counseling regarding the diagnosis and prognosis.  Questions are answered at this time and they are agreeable with the plan.      --------------------------------- IMPRESSION AND DISPOSITION ---------------------------------        IMPRESSION  1. Acute  bronchitis, unspecified organism        DISPOSITION  Disposition: Discharge to home  Patient condition is fair      Billing Provider Critical Care Time: 0 minutes         [1]   Past Medical History:  Diagnosis Date    Anxiety     Arthritis     Bell palsy     Cataract     CTS (carpal tunnel syndrome)     Difficulty walking     Disease of thyroid gland     Heart disease     Hypertension     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)     Narcolepsy    Neuropathy in diabetes (Multi)     Numbness     Obstructive sleep apnea (adult) (pediatric)     Obstructive sleep apnea    Personal history of other diseases of the circulatory system     History of hypertension    Personal history of other endocrine, nutritional and metabolic disease     History of hypercholesterolemia    Personal history of other endocrine, nutritional and metabolic disease     History of hypothyroidism    Personal history of other endocrine, nutritional and metabolic disease     History of diabetes mellitus    Restless leg syndrome    [2]   Past Surgical History:  Procedure Laterality Date    CAROTID ENDARTERECTOMY      CARPAL TUNNEL RELEASE      CATARACT EXTRACTION  07/03/2013    Cataract Surgery    CORONARY ARTERY BYPASS GRAFT  01/12/2017    CABG    HYSTERECTOMY  07/03/2013    Hysterectomy    NECK SURGERY  07/03/2013    Neck Surgery        Lebron Penny MD  05/25/25 [id number]

## 2025-05-25 NOTE — DISCHARGE INSTRUCTIONS
Augmentin and Tessalon Perles as prescribed.    A coolmist vaporizer in the bedroom helped control the cough at night.    Use over-the-counter cough suppressant such as DayQuil NyQuil or Vicks.    Return for worsening symptoms or concerns.

## 2025-05-27 LAB — HOLD SPECIMEN: 293

## 2025-07-07 ENCOUNTER — TELEPHONE (OUTPATIENT)
Dept: OBSTETRICS AND GYNECOLOGY | Facility: CLINIC | Age: 65
End: 2025-07-07
Payer: MEDICARE

## 2025-07-07 DIAGNOSIS — R39.9 SYMPTOMS OF URINARY TRACT INFECTION: ICD-10-CM

## 2025-07-08 NOTE — PROGRESS NOTES
Urogynecology  Provider:  Silvia Rosa MD  [phone]    ASSESSMENT AND PLAN:   64 year old female with OAB. Comorbidities include: CAD s/p CABG, HTN, GODWIN, hypothyroidism, and diabetes.     Diagnoses:  #1 Overactive bladder  #2 Type 2 diabetes mellitus  #3 Obstructive sleep apnea    Plan:  1. OAB, nocturia  - PVR = 1mL by bladder U/S.   - Previously failed several intradetrusor Botox injections in 2023 with no symptom improvement.   - Patient has GODWIN and wears a CPAP but has not had the CPAP settings checked in a long time.    > Recommended she return to sleep medicine to evaluate if CPAP settings need to be changed to optimize and improve nocturia.   - Her T2DM is poorly controlled with A1c at 8.8% on 6/16/2025 and reviewed that this can contribute to her OAB symptoms.   > Recommended good diabetes management with optimal A1c range being 6.5% and below to reduce OAB symptoms as poor diabetic control can cause neurological issues with the sacral nerve that controls bladder function and if she has persistent hyperglycemia this can cause glycosuria which can irritate the bladder. We encouraged CGM, good dietary habits with low carbohydrate diet, exercise, and compliance with medication/insulin regimen to better control blood glucose levels.   - Recommended stopping fluids 1-2 hours before bed and to keep a bladder diary to evaluate if she experiences nocturnal enuresis to determine if she is a candidate to start DDAVP with routine sodium labs +/- possible sodium tablet supplementation to prevent life-threatening hyponatremia.    - We discussed OAB management options such as continue taking Gemtesa + Vesicare although this is not very effective or SNM.   > Plan to stop fluids before bed, keep a bladder diary, continue Gemtesa + Vesicare but switch to taking pills at night, follow up with sleep medicine to adjust CPAP settings, and improving T2DM control. If nocturia symptoms persist with this regimen, we will  seriously consider starting her on DDAVP in the future.   - Continue taking Gemtesa 75mg and Solifenacin 10mg combination at night to help manage her OAB with only mild improvement and still having significant breakthrough nocturia and UUI episodes.     Follow up in 2-3 months with Dr. Rosa.     Inesibe Attestation  By signing my name below, I, Jaden Fung, Brain, attest that this documentation has been prepared under the direction and in the presence of Silvia Rosa MD on 07/10/2025 at 2:26 PM.     Agree with above. I Dr. Rosa, personally performed the services described in the documentation which was scribed virtually and confirm it is both complete and accurate.  Silvia Rosa MD      Problem List Items Addressed This Visit    None  Visit Diagnoses         Urinary disorder    -  Primary    Relevant Orders    Measure post void residual (Completed)    POCT UA Automated manually resulted                I spent a total of eConsult Time: 25 minutes in face to face and non face to face time.        Silvia Rosa MD        HISTORY OF PRESENT ILLNESS:   64 year old female presenting in follow up for OAB.    Records Review:   - Last visit 9/2024  She continues taking Gemtesa 75mg and Solifenacin 10mg combination daily to help manage her OAB with only mild improvement and still having significant breakthrough incontinence episodes.       Urinary Symptoms:   - Primarily is bothered by nocturia every 1 hour at night with UUI on her way to the bathroom.   - OAB symptoms that has been ongoing for the past several years.  - She currently has an acute UTI.    - Previously took Gemtesa 75mg and Vesicare 10mg daily with improved bladder control on this regimen.   - Previously failed intradetrusor Botox injections in 2023 with no symptom improvement.   - Patient has GODWIN and wears a CPAP but has not had the CPAP settings checked in a long time.   - Drinks liquids up until bedtime which exacerbates her  "nocturia; she has tried limiting fluids before bed in the past with persistent # of nocturia.   - Her T2DM is poorly controlled with A1c at 8.8% on 6/16/2025 and reviewed that this can contribute to her OAB symptoms.     Social History:  - She has custody of her two grandchildren.       Past Medical History:    Medical History[1]       Past Surgical History:     Surgical History[2]      Medications:     Prior to Admission medications    Medication Sig Start Date End Date Taking? Authorizing Provider   acetaminophen (Tylenol) 500 mg tablet Take 1 tablet (500 mg) by mouth every 6 hours if needed for pain. 9/19/24   Historical Provider, MD   atorvastatin (Lipitor) 80 mg tablet Take 1 tablet (80 mg) by mouth once daily at bedtime. 2/12/25   Rickey Amezcua MD PhD   BD Insulin Syringe Ultra-Fine 0.5 mL 31 gauge x 5/16\" syringe USE THREE TIMES A DAY 6/9/24   Historical Provider, MD   gabapentin (Neurontin) 300 mg capsule Take 1 capsule (300 mg) by mouth once daily at bedtime. 6/3/24 6/3/25  BRYANT Rodriguez    mg tablet Take 1 tablet (600 mg) by mouth every 6 hours if needed for pain. 9/19/24   Historical Provider, MD   insulin regular (HumuLIN R U-500) 500 unit/mL CONCENTRATED injection .25 mL brkfst, .25 mL lunch, .10 mL dinner Subcutaneous with meals for 90 days    Historical Provider, MD   levothyroxine (Synthroid, Levoxyl) 125 mcg tablet Take 1 tablet (125 mcg) by mouth once daily. 2/12/25 2/12/26  Rickey Amezcua MD PhD   methylphenidate (Ritalin) 20 mg tablet Take 1 tablet (20 mg) by mouth 2 times a day. Do not fill before July 8, 2025. 7/8/25 8/7/25  BRYANT Rodriguez   methylphenidate (Ritalin) 20 mg tablet Take 1 tablet (20 mg) by mouth 2 times a day. Do not fill before June 8, 2025. 6/8/25 7/8/25  BRYANT Rodriguez   methylphenidate (Ritalin) 20 mg tablet Take 1 tablet (20 mg) by mouth 2 times a day. Do not fill before May 9, 2025. 5/9/25 6/8/25  Cadence López, APRN-CNP "   metoprolol tartrate (Lopressor) 25 mg tablet Take 1 tablet (25 mg) by mouth 2 times a day. 11/16/23 11/19/24  Joycelyn Patrick APRN-CNP   sertraline (Zoloft) 100 mg tablet Take 1 tablet (100 mg) by mouth once daily at bedtime. 11/26/24 5/25/25  Zeny Lindsey Hong, DO   valsartan (Diovan) 80 mg tablet Take 1 tablet (80 mg) by mouth once daily. 2/12/25 2/12/26  Rickey Amezcua MD PhD        ROS  Review of Systems     POC Blood, Urine   Date Value Ref Range Status   09/16/2024 NEGATIVE NEGATIVE Final     Poc Nitrite, Urine   Date Value Ref Range Status   09/16/2024 POSITIVE (A) NEGATIVE Final     POC Urobilinogen, Urine   Date Value Ref Range Status   09/16/2024 0.2 0.2, 1.0 EU/DL Final     POC Leukocytes, Urine   Date Value Ref Range Status   09/16/2024 SMALL (1+) (A) NEGATIVE Final         PHYSICAL EXAM:      PVR= 1 mL US  There were no vitals taken for this visit.     No LMP recorded. Patient has had a hysterectomy.    Declines chaperone for physical exam.    Well developed, well nourished, in no apparent distress.   Neurologic/Psychiatric:  Awake, Alert and Oriented times 3.  Affect normal.     Data and DIAGNOSTIC STUDIES REVIEWED   Imaging  No results found.    Cardiology, Vascular, and Other Imaging  No other imaging results found for the past 7 days     Lab Results   Component Value Date    URINECULTURE >100,000 Escherichia coli (A) 09/16/2024      Lab Results   Component Value Date    GLUCOSE 350 (H) 05/25/2025    CALCIUM 9.4 05/25/2025     (L) 05/25/2025    K 4.2 05/25/2025    CO2 29 05/25/2025    CL 99 05/25/2025    BUN 20 05/25/2025    CREATININE 0.99 05/25/2025     Lab Results   Component Value Date    WBC 8.5 05/25/2025    HGB 13.6 05/25/2025    HCT 41.6 05/25/2025    MCV 92 05/25/2025     05/25/2025          Silvia Rosa MD         [1]   Past Medical History:  Diagnosis Date    Anxiety     Arthritis     Bell palsy     Cataract     CTS (carpal tunnel syndrome)     Difficulty walking      Disease of thyroid gland     Heart disease     Hypertension     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)     Narcolepsy    Neuropathy in diabetes (Multi)     Numbness     Obstructive sleep apnea (adult) (pediatric)     Obstructive sleep apnea    Personal history of other diseases of the circulatory system     History of hypertension    Personal history of other endocrine, nutritional and metabolic disease     History of hypercholesterolemia    Personal history of other endocrine, nutritional and metabolic disease     History of hypothyroidism    Personal history of other endocrine, nutritional and metabolic disease     History of diabetes mellitus    Restless leg syndrome    [2]   Past Surgical History:  Procedure Laterality Date    CAROTID ENDARTERECTOMY      CARPAL TUNNEL RELEASE      CATARACT EXTRACTION  07/03/2013    Cataract Surgery    CORONARY ARTERY BYPASS GRAFT  01/12/2017    CABG    HYSTERECTOMY  07/03/2013    Hysterectomy    NECK SURGERY  07/03/2013    Neck Surgery

## 2025-07-10 ENCOUNTER — OFFICE VISIT (OUTPATIENT)
Dept: OBSTETRICS AND GYNECOLOGY | Facility: CLINIC | Age: 65
End: 2025-07-10
Payer: MEDICARE

## 2025-07-10 VITALS
BODY MASS INDEX: 40.52 KG/M2 | HEIGHT: 62 IN | DIASTOLIC BLOOD PRESSURE: 70 MMHG | SYSTOLIC BLOOD PRESSURE: 111 MMHG | HEART RATE: 60 BPM | WEIGHT: 220.2 LBS

## 2025-07-10 DIAGNOSIS — N39.9 URINARY DISORDER: Primary | ICD-10-CM

## 2025-07-10 DIAGNOSIS — N39.0 ACUTE LOWER UTI: Primary | ICD-10-CM

## 2025-07-10 LAB — BACTERIA UR CULT: ABNORMAL

## 2025-07-10 PROCEDURE — 51798 US URINE CAPACITY MEASURE: CPT | Performed by: OBSTETRICS & GYNECOLOGY

## 2025-07-10 PROCEDURE — 1126F AMNT PAIN NOTED NONE PRSNT: CPT | Performed by: OBSTETRICS & GYNECOLOGY

## 2025-07-10 PROCEDURE — 3074F SYST BP LT 130 MM HG: CPT | Performed by: OBSTETRICS & GYNECOLOGY

## 2025-07-10 PROCEDURE — 99213 OFFICE O/P EST LOW 20 MIN: CPT | Performed by: OBSTETRICS & GYNECOLOGY

## 2025-07-10 PROCEDURE — 3008F BODY MASS INDEX DOCD: CPT | Performed by: OBSTETRICS & GYNECOLOGY

## 2025-07-10 PROCEDURE — 99213 OFFICE O/P EST LOW 20 MIN: CPT | Mod: 25 | Performed by: OBSTETRICS & GYNECOLOGY

## 2025-07-10 PROCEDURE — 4010F ACE/ARB THERAPY RXD/TAKEN: CPT | Performed by: OBSTETRICS & GYNECOLOGY

## 2025-07-10 PROCEDURE — 1159F MED LIST DOCD IN RCRD: CPT | Performed by: OBSTETRICS & GYNECOLOGY

## 2025-07-10 PROCEDURE — 3078F DIAST BP <80 MM HG: CPT | Performed by: OBSTETRICS & GYNECOLOGY

## 2025-07-10 RX ORDER — NITROFURANTOIN 25; 75 MG/1; MG/1
100 CAPSULE ORAL EVERY 12 HOURS SCHEDULED
Qty: 14 CAPSULE | Refills: 0 | Status: SHIPPED | OUTPATIENT
Start: 2025-07-10 | End: 2025-07-17

## 2025-07-10 ASSESSMENT — PAIN SCALES - GENERAL: PAINLEVEL_OUTOF10: 0-NO PAIN

## 2025-07-10 ASSESSMENT — ENCOUNTER SYMPTOMS
DEPRESSION: 0
OCCASIONAL FEELINGS OF UNSTEADINESS: 1
LOSS OF SENSATION IN FEET: 1

## 2025-07-22 ENCOUNTER — APPOINTMENT (OUTPATIENT)
Dept: OPHTHALMOLOGY | Facility: CLINIC | Age: 65
End: 2025-07-22
Payer: MEDICARE

## 2025-07-22 ENCOUNTER — OFFICE VISIT (OUTPATIENT)
Dept: OPHTHALMOLOGY | Age: 65
End: 2025-07-22
Payer: MEDICARE

## 2025-07-22 DIAGNOSIS — Z96.1 PSEUDOPHAKIA: ICD-10-CM

## 2025-07-22 DIAGNOSIS — H43.813 POSTERIOR VITREOUS DETACHMENT OF BOTH EYES: ICD-10-CM

## 2025-07-22 DIAGNOSIS — E11.3299 NONPROLIFERATIVE DIABETIC RETINOPATHY: ICD-10-CM

## 2025-07-22 DIAGNOSIS — E11.311: ICD-10-CM

## 2025-07-22 DIAGNOSIS — E11.3293 TYPE 2 DIABETES MELLITUS WITH BOTH EYES AFFECTED BY MILD NONPROLIFERATIVE RETINOPATHY WITHOUT MACULAR EDEMA, WITHOUT LONG-TERM CURRENT USE OF INSULIN: Primary | ICD-10-CM

## 2025-07-22 PROCEDURE — 92134 CPTRZ OPH DX IMG PST SGM RTA: CPT | Performed by: OPHTHALMOLOGY

## 2025-07-22 PROCEDURE — 2022F DILAT RTA XM EVC RTNOPTHY: CPT | Performed by: OPHTHALMOLOGY

## 2025-07-22 PROCEDURE — 99213 OFFICE O/P EST LOW 20 MIN: CPT | Performed by: OPHTHALMOLOGY

## 2025-07-22 RX ORDER — INSULIN DEGLUDEC 100 U/ML
INJECTION, SOLUTION SUBCUTANEOUS
COMMUNITY

## 2025-07-22 RX ORDER — INSULIN ASPART 100 [IU]/ML
INJECTION, SOLUTION INTRAVENOUS; SUBCUTANEOUS
COMMUNITY
Start: 2025-06-17

## 2025-07-22 RX ORDER — CHOLECALCIFEROL (VITAMIN D3) 25 MCG
25 TABLET ORAL DAILY
COMMUNITY

## 2025-07-22 RX ORDER — VIBEGRON 75 MG/1
TABLET, FILM COATED ORAL
COMMUNITY
Start: 2025-07-01

## 2025-07-22 RX ORDER — GLUCAGON INJECTION, SOLUTION 1 MG/.2ML
INJECTION, SOLUTION SUBCUTANEOUS
COMMUNITY
Start: 2025-03-04

## 2025-07-22 RX ORDER — ASPIRIN 81 MG/1
81 TABLET ORAL DAILY
COMMUNITY

## 2025-07-22 RX ORDER — PEN NEEDLE, DIABETIC 31 GX5/16"
NEEDLE, DISPOSABLE MISCELLANEOUS
COMMUNITY
Start: 2025-06-17

## 2025-07-22 RX ORDER — SOLIFENACIN SUCCINATE 10 MG/1
1 TABLET, FILM COATED ORAL
COMMUNITY
Start: 2025-03-16

## 2025-07-22 RX ORDER — CHLORHEXIDINE GLUCONATE ORAL RINSE 1.2 MG/ML
SOLUTION DENTAL
COMMUNITY
Start: 2025-04-16

## 2025-07-22 ASSESSMENT — CONF VISUAL FIELD
OS_SUPERIOR_TEMPORAL_RESTRICTION: 0
OD_INFERIOR_TEMPORAL_RESTRICTION: 0
OS_NORMAL: 1
OS_INFERIOR_TEMPORAL_RESTRICTION: 0
OD_INFERIOR_NASAL_RESTRICTION: 0
OS_INFERIOR_NASAL_RESTRICTION: 0
OD_SUPERIOR_NASAL_RESTRICTION: 0
OD_NORMAL: 1
OD_SUPERIOR_TEMPORAL_RESTRICTION: 0
OS_SUPERIOR_NASAL_RESTRICTION: 0

## 2025-07-22 ASSESSMENT — TONOMETRY
IOP_METHOD: GOLDMANN APPLANATION
OS_IOP_MMHG: 19
OD_IOP_MMHG: 19

## 2025-07-22 ASSESSMENT — REFRACTION_MANIFEST
OS_ADD: +2.50
OS_AXIS: 060
OD_ADD: +2.50
OS_CYLINDER: -0.75
OD_AXIS: 090
OD_SPHERE: -0.25
OS_SPHERE: +0.25
OD_CYLINDER: -0.75

## 2025-07-22 ASSESSMENT — EXTERNAL EXAM - LEFT EYE: OS_EXAM: NORMAL

## 2025-07-22 ASSESSMENT — VISUAL ACUITY
CORRECTION_TYPE: GLASSES
OD_CC+: -2
METHOD: SNELLEN - LINEAR
OS_CC: 20/25
OD_CC: 20/30

## 2025-07-22 ASSESSMENT — CUP TO DISC RATIO
OS_RATIO: .3
OD_RATIO: .3

## 2025-07-22 ASSESSMENT — REFRACTION_WEARINGRX
OS_AXIS: 065
OS_CYLINDER: -0.50
OD_CYLINDER: -1.00
OS_SPHERE: -0.50
SPECS_TYPE: SVL
OD_SPHERE: -0.75
OD_AXIS: 075

## 2025-07-22 ASSESSMENT — SLIT LAMP EXAM - LIDS
COMMENTS: GOOD POSITION
COMMENTS: GOOD POSITION

## 2025-07-22 ASSESSMENT — EXTERNAL EXAM - RIGHT EYE: OD_EXAM: NORMAL

## 2025-07-22 NOTE — PROGRESS NOTES
Annual visit    DM 2 with NPDR OU and DME OD   h/o DM for over 30 years, a1c keeps fluctuating , difficult to control BG due to insulin resistance, LTFU for few years as she lost insurance, now on [employer] since 2020  Pt sts she has recently changed insulin and blood sugar has been running very high.  This morning BS was 289, has been running around 300 or just below.  Working with PCP to adjust insulin and get sugars down.   Last A1c at 8.8  On exam: both eyes with mid peripheral and inf heme   OCT Macula:   OD: subfoveal IRF  OS: WNL  A; new onset DME OD - high BG not helping  P: refer to Dr. Baca for further eval      2.  PVD OU  stable, monitor      3. Pseudophakia OU   had toric lenses placed, opted for monovision with OD of reading   toric axis at 20   donig well.      4 Myopia  uses glasses for distance, and cheaters for up close. now ahs more difficulty with reading.

## 2025-08-01 DIAGNOSIS — E06.3 HYPOTHYROIDISM DUE TO HASHIMOTO'S THYROIDITIS: ICD-10-CM

## 2025-08-01 RX ORDER — LEVOTHYROXINE SODIUM 125 UG/1
125 TABLET ORAL DAILY
Qty: 90 TABLET | Refills: 0 | Status: SHIPPED | OUTPATIENT
Start: 2025-08-01

## 2025-08-06 DIAGNOSIS — I10 PRIMARY HYPERTENSION: ICD-10-CM

## 2025-08-06 RX ORDER — VALSARTAN 80 MG/1
80 TABLET ORAL DAILY
Qty: 90 TABLET | Refills: 0 | OUTPATIENT
Start: 2025-08-06

## 2025-08-25 ENCOUNTER — APPOINTMENT (OUTPATIENT)
Dept: OPHTHALMOLOGY | Age: 65
End: 2025-08-25
Payer: MEDICARE

## 2025-08-25 DIAGNOSIS — H35.89 OTHER SPECIFIED RETINAL DISORDERS: ICD-10-CM

## 2025-08-25 DIAGNOSIS — E11.3293 TYPE 2 DIABETES MELLITUS WITH BOTH EYES AFFECTED BY MILD NONPROLIFERATIVE RETINOPATHY WITHOUT MACULAR EDEMA, WITH LONG-TERM CURRENT USE OF INSULIN: ICD-10-CM

## 2025-08-25 DIAGNOSIS — E11.311: Primary | ICD-10-CM

## 2025-08-25 DIAGNOSIS — Z79.4 TYPE 2 DIABETES MELLITUS WITH BOTH EYES AFFECTED BY MILD NONPROLIFERATIVE RETINOPATHY WITHOUT MACULAR EDEMA, WITH LONG-TERM CURRENT USE OF INSULIN: ICD-10-CM

## 2025-08-25 DIAGNOSIS — E11.3211 MILD NONPROLIFERATIVE DIABETIC RETINOPATHY OF RIGHT EYE WITH MACULAR EDEMA ASSOCIATED WITH TYPE 2 DIABETES MELLITUS: ICD-10-CM

## 2025-08-25 PROCEDURE — 92134 CPTRZ OPH DX IMG PST SGM RTA: CPT | Performed by: OPHTHALMOLOGY

## 2025-08-25 PROCEDURE — 99214 OFFICE O/P EST MOD 30 MIN: CPT | Performed by: OPHTHALMOLOGY

## 2025-08-25 ASSESSMENT — ENCOUNTER SYMPTOMS
RESPIRATORY NEGATIVE: 0
EYES NEGATIVE: 1
ALLERGIC/IMMUNOLOGIC NEGATIVE: 0
PSYCHIATRIC NEGATIVE: 0
CONSTITUTIONAL NEGATIVE: 0
GASTROINTESTINAL NEGATIVE: 0
CARDIOVASCULAR NEGATIVE: 0
HEMATOLOGIC/LYMPHATIC NEGATIVE: 0
ENDOCRINE NEGATIVE: 1
MUSCULOSKELETAL NEGATIVE: 0
NEUROLOGICAL NEGATIVE: 0

## 2025-08-25 ASSESSMENT — SLIT LAMP EXAM - LIDS
COMMENTS: GOOD POSITION
COMMENTS: GOOD POSITION

## 2025-08-25 ASSESSMENT — EXTERNAL EXAM - LEFT EYE: OS_EXAM: NORMAL

## 2025-08-25 ASSESSMENT — VISUAL ACUITY
METHOD: SNELLEN - LINEAR
OD_PH_CC: 20/25
CORRECTION_TYPE: GLASSES
OS_CC: 20/25
OD_CC: 20/40

## 2025-08-25 ASSESSMENT — CUP TO DISC RATIO
OD_RATIO: .3
OS_RATIO: .3

## 2025-08-25 ASSESSMENT — TONOMETRY
OS_IOP_MMHG: 19
IOP_METHOD: GOLDMANN APPLANATION
OD_IOP_MMHG: 19

## 2025-08-25 ASSESSMENT — EXTERNAL EXAM - RIGHT EYE: OD_EXAM: NORMAL

## 2025-08-26 ASSESSMENT — ENCOUNTER SYMPTOMS
DEPRESSION: 0
OCCASIONAL FEELINGS OF UNSTEADINESS: 1
LOSS OF SENSATION IN FEET: 0

## 2025-08-27 ENCOUNTER — TELEMEDICINE (OUTPATIENT)
Dept: NEUROLOGY | Facility: HOSPITAL | Age: 65
End: 2025-08-27
Payer: MEDICARE

## 2025-08-27 DIAGNOSIS — G25.81 RESTLESS LEGS SYNDROME: ICD-10-CM

## 2025-08-27 DIAGNOSIS — E11.42 DIABETIC POLYNEUROPATHY ASSOCIATED WITH TYPE 2 DIABETES MELLITUS: ICD-10-CM

## 2025-08-27 DIAGNOSIS — G47.30 HYPERSOMNIA WITH SLEEP APNEA: Primary | ICD-10-CM

## 2025-08-27 DIAGNOSIS — G47.33 OBSTRUCTIVE SLEEP APNEA: ICD-10-CM

## 2025-08-27 DIAGNOSIS — G47.10 HYPERSOMNIA WITH SLEEP APNEA: Primary | ICD-10-CM

## 2025-08-27 PROCEDURE — 1036F TOBACCO NON-USER: CPT | Performed by: NURSE PRACTITIONER

## 2025-08-27 PROCEDURE — 99214 OFFICE O/P EST MOD 30 MIN: CPT | Performed by: NURSE PRACTITIONER

## 2025-08-27 PROCEDURE — 1160F RVW MEDS BY RX/DR IN RCRD: CPT | Performed by: NURSE PRACTITIONER

## 2025-08-27 PROCEDURE — 1159F MED LIST DOCD IN RCRD: CPT | Performed by: NURSE PRACTITIONER

## 2025-08-27 PROCEDURE — 4010F ACE/ARB THERAPY RXD/TAKEN: CPT | Performed by: NURSE PRACTITIONER

## 2025-08-27 RX ORDER — GABAPENTIN 300 MG/1
300 CAPSULE ORAL NIGHTLY
Qty: 90 CAPSULE | Refills: 3 | Status: SHIPPED | OUTPATIENT
Start: 2025-08-27 | End: 2026-08-27

## 2025-08-27 RX ORDER — METHYLPHENIDATE HYDROCHLORIDE 20 MG/1
20 TABLET ORAL 2 TIMES DAILY
Qty: 60 TABLET | Refills: 0 | Status: SHIPPED | OUTPATIENT
Start: 2025-08-27 | End: 2025-09-26

## 2025-09-08 ENCOUNTER — APPOINTMENT (OUTPATIENT)
Dept: OPHTHALMOLOGY | Age: 65
End: 2025-09-08
Payer: MEDICARE

## 2026-07-28 ENCOUNTER — APPOINTMENT (OUTPATIENT)
Dept: OPHTHALMOLOGY | Age: 66
End: 2026-07-28
Payer: MEDICARE